# Patient Record
Sex: MALE | Race: BLACK OR AFRICAN AMERICAN | Employment: UNEMPLOYED | URBAN - METROPOLITAN AREA
[De-identification: names, ages, dates, MRNs, and addresses within clinical notes are randomized per-mention and may not be internally consistent; named-entity substitution may affect disease eponyms.]

---

## 2017-11-28 ENCOUNTER — OFFICE VISIT (OUTPATIENT)
Dept: INTERNAL MEDICINE CLINIC | Age: 47
End: 2017-11-28

## 2017-11-28 VITALS
TEMPERATURE: 97.9 F | DIASTOLIC BLOOD PRESSURE: 74 MMHG | SYSTOLIC BLOOD PRESSURE: 113 MMHG | WEIGHT: 241 LBS | OXYGEN SATURATION: 99 % | HEIGHT: 68 IN | BODY MASS INDEX: 36.53 KG/M2 | HEART RATE: 86 BPM

## 2017-11-28 DIAGNOSIS — M65.332 TRIGGER MIDDLE FINGER OF LEFT HAND: Primary | ICD-10-CM

## 2017-11-28 NOTE — PROGRESS NOTES
Patient is here today for acute visit. Complaining of left  3rd finger not able to bend and pain with movement. Not   accident related. Onset x 2 months.

## 2017-11-28 NOTE — PROGRESS NOTES
HISTORY OF PRESENT ILLNESS  Benjamin Lewis is a 49 y.o. male. HPI     C/o pain when trying to flex left and extend left 3rd finger x 2 mos  No trauma and no swelling   right handed\  advil does not help much      Patient Active Problem List    Diagnosis Date Noted    Hyperglycemia 06/04/2014    Obesity 09/24/2013       No Known Allergies        ROS    Physical Exam   Musculoskeletal:   Some stiffiness of left 3rd finger at PIP       ASSESSMENT and PLAN  Diagnoses and all orders for this visit:    1. Trigger middle finger of left hand  Elizabeth Ro [facility]   Reference handout was given to pt and discussed      Follow-up Disposition:  Return in about 9 months (around 8/28/2018) for cpe.

## 2017-11-28 NOTE — MR AVS SNAPSHOT
Visit Information Date & Time Provider Department Dept. Phone Encounter #  
 11/28/2017  9:15 AM Kika Long, [address] [place] [address],[address] [phone] [id number] Follow-up Instructions Return in about 9 months (around 8/28/2018) for cpe. Upcoming Health Maintenance Date Due DTaP/Tdap/Td series (1 - Tdap) 4/2/1990 Allergies as of 11/28/2017  Review Complete On: 11/28/2017 By: Sherrill Bryant LPN No Known Allergies Current Immunizations  Never Reviewed No immunizations on file. Not reviewed this visit You Were Diagnosed With   
  
 Codes Comments Trigger middle finger of left hand    -  Primary ICD-10-CM: T62.013 ICD-9-CM: 727.03 Vitals BP Pulse Temp Height(growth percentile) Weight(growth percentile) SpO2  
 113/74 (BP 1 Location: Left arm, BP Patient Position: Sitting) 86 97.9 °F (36.6 °C) (Oral) 5' 8\" (1.727 m) 241 lb (109.3 kg) 99% BMI Smoking Status 36.64 kg/m2 Never Smoker BMI and BSA Data Body Mass Index Body Surface Area  
 36.64 kg/m 2 2.29 m 2 Preferred Pharmacy Pharmacy Name Phone [facility] [address] [phone] Your Updated Medication List  
  
Notice  As of 11/28/2017 10:16 AM  
 You have not been prescribed any medications. We Performed the Following REFERRAL TO ORTHOPEDICS [AXR185 Custom] Follow-up Instructions Return in about 9 months (around 8/28/2018) for cpe. Referral Information Referral ID Referred By Referred To  
  
 [id number] SHAUN MACIEL [facility]   
   [address] [facility] [address], [address] [place] [address] Visits Status Start Date End Date 1 New Request 11/28/17 11/28/18 If your referral has a status of pending review or denied, additional information will be sent to support the outcome of this decision. [facility]! [facility] introduces [employer] patient portal. Now you can access parts of your medical record, email your doctor's office, and request medication refills online. 1. In your internet browser, go to [email or web address] 2. Click on the First Time User? Click Here link in the Sign In box. You will see the New Member Sign Up page. 3. Enter your Etonkids Access Code exactly as it appears below. You will not need to use this code after youve completed the sign-up process. If you do not sign up before the expiration date, you must request a new code. · E[employer]kids Access Code: [email or web address] Expires: 2/26/2018 10:16 AM 
 
4. Enter the last four digits of your Social Security Number (xxxx) and Date of Birth (mm/dd/yyyy) as indicated and click Submit. You will be taken to the next sign-up page. 5. Create a Etonkids ID. This will be your Etonkids login ID and cannot be changed, so think of one that is secure and easy to remember. 6. Create a Etonkids password. You can change your password at any time. 7. Enter your Password Reset Question and Answer. This can be used at a later time if you forget your password. 8. Enter your e-mail address. You will receive e-mail notification when new information is available in [address] 9. Click Sign Up. You can now view and download portions of your medical record. 10. Click the Download Summary menu link to download a portable copy of your medical information. If you have questions, please visit the Frequently Asked Questions section of the Etonkids website. Remember, Etonkids is NOT to be used for urgent needs. For medical emergencies, dial 911. Now available from your iPhone and Android! Please provide this summary of care documentation to your next provider. Your primary care clinician is listed as Farley Babinski LEE. If you have any questions after today's visit, please call [phone].

## 2018-02-16 ENCOUNTER — TELEPHONE (OUTPATIENT)
Dept: INTERNAL MEDICINE CLINIC | Age: 48
End: 2018-02-16

## 2018-02-16 NOTE — TELEPHONE ENCOUNTER
Patient states she needs a call back in reference to seeing if he can be worked in today instead of waiting for his appt with Dr. Ange Mercado on this coming Mon., 2/19/18. Patient states he's feeling worse. Please call to advise.  Thank you

## 2018-02-19 ENCOUNTER — OFFICE VISIT (OUTPATIENT)
Dept: INTERNAL MEDICINE CLINIC | Age: 48
End: 2018-02-19

## 2018-02-19 VITALS
OXYGEN SATURATION: 99 % | HEIGHT: 68 IN | TEMPERATURE: 98.1 F | SYSTOLIC BLOOD PRESSURE: 118 MMHG | WEIGHT: 238 LBS | DIASTOLIC BLOOD PRESSURE: 76 MMHG | HEART RATE: 81 BPM | BODY MASS INDEX: 36.07 KG/M2

## 2018-02-19 DIAGNOSIS — E66.9 CLASS 2 OBESITY WITHOUT SERIOUS COMORBIDITY WITH BODY MASS INDEX (BMI) OF 36.0 TO 36.9 IN ADULT, UNSPECIFIED OBESITY TYPE: ICD-10-CM

## 2018-02-19 DIAGNOSIS — L91.8 SKIN TAG: ICD-10-CM

## 2018-02-19 DIAGNOSIS — J01.20 ACUTE NON-RECURRENT ETHMOIDAL SINUSITIS: Primary | ICD-10-CM

## 2018-02-19 RX ORDER — AMOXICILLIN 500 MG/1
500 CAPSULE ORAL 3 TIMES DAILY
Qty: 21 CAP | Refills: 0 | Status: SHIPPED | OUTPATIENT
Start: 2018-02-19 | End: 2018-08-06 | Stop reason: ALTCHOICE

## 2018-02-19 NOTE — PROGRESS NOTES
Patient is here today for acute visit complaining of head and chest congestion   non-productive cough onset 2/14/18. Patient also complaining of low back pain   not accident related onset x 1 month.

## 2018-02-19 NOTE — PROGRESS NOTES
HISTORY OF PRESENT ILLNESS  Maureen Sandoval is a 49 y.o. male. HPI   Sick for 8 d  Had chills x 2 days then chest and sinus congestion  No sorethroat  Some popping of ears  Deep cough  Sinus congestion--clear, no sinus pain  Taking otc cough med  Also c/o large skin tag left axilla bothersome  C/o difficulty losing weight--request referral to see dietician    Patient Active Problem List    Diagnosis Date Noted    Hyperglycemia 06/04/2014    Obesity 09/24/2013       No Known Allergies   Lab Results  Component Value Date/Time   GFR est non-AA 60 08/03/2016 11:00 AM   GFR est AA 69 08/03/2016 11:00 AM   Creatinine 1.39 (H) 08/03/2016 11:00 AM   BUN 12 08/03/2016 11:00 AM   Sodium 140 08/03/2016 11:00 AM   Potassium 4.5 08/03/2016 11:00 AM   Chloride 100 08/03/2016 11:00 AM   CO2 23 08/03/2016 11:00 AM        ROS    Physical Exam   Constitutional: He appears well-developed and well-nourished. No distress. Appears stated age, obese, nad   HENT:   Head: Normocephalic. Cardiovascular: Normal rate, regular rhythm and normal heart sounds. Exam reveals no gallop and no friction rub. No murmur heard. Pulmonary/Chest: Effort normal and breath sounds normal.   Abdominal: Soft. Musculoskeletal: He exhibits no edema. Neurological: He is alert. Skin:   Large left axilla skin tag   Psychiatric: He has a normal mood and affect. Nursing note and vitals reviewed. ASSESSMENT and PLAN  Diagnoses and all orders for this visit:    1. Acute non-recurrent ethmoidal sinusitis   Amoxil x 7d   otc decongestant ok  2. Skin tag  -     [facility] General Surgery ref ED [facility]    3. Class 2 obesity without serious comorbidity with body mass index (BMI) of 36.0 to 36.9 in adult, unspecified obesity type  -     REFERRAL TO NUTRITION   Discussed carb counting and handout given to pt   Regular exercise     Other orders  -     amoxicillin (AMOXIL) 500 mg capsule; Take 1 Cap by mouth three (3) times daily.       Follow-up Disposition:  Return if symptoms worsen or fail to improve.

## 2018-02-19 NOTE — MR AVS SNAPSHOT
Jignesh Rebolledo [address] [facility] 
[phone] Patient: Samantha Samuel MRN: [id number] :1970 Visit Information Date & Time Provider Department Dept. Phone Encounter #  
 2018  9:15 AM Cole Lawton, [address] [place] [address],[address] Floor [phone] [id number] Follow-up Instructions Return if symptoms worsen or fail to improve. Your Appointments 2018  9:15 AM  
PHYSICAL PRE OP with Cole Lawton MD  
[facility]) Appt Note: CPE  
 [address] [address]  
[address] [facility] [address] [place] [address] Upcoming Health Maintenance Date Due DTaP/Tdap/Td series (1 - Tdap) 1990 Allergies as of 2018  Review Complete On: 2018 By: Krupa Quiroz LPN No Known Allergies Current Immunizations  Never Reviewed No immunizations on file. Not reviewed this visit You Were Diagnosed With   
  
 Codes Comments Acute non-recurrent ethmoidal sinusitis    -  Primary ICD-10-CM: J01.20 ICD-9-CM: 461.2 Skin tag     ICD-10-CM: L91.8 ICD-9-CM: 701.9 Class 2 obesity without serious comorbidity with body mass index (BMI) of 36.0 to 36.9 in adult, unspecified obesity type     ICD-10-CM: E66.9, Z68.36 
ICD-9-CM: 278.00, V85.36 Vitals BP Pulse Temp Height(growth percentile) Weight(growth percentile) SpO2  
 118/76 (BP 1 Location: Left arm, BP Patient Position: Sitting) 81 98.1 °F (36.7 °C) (Oral) 5' 8\" (1.727 m) 238 lb (108 kg) 99% BMI Smoking Status 36.19 kg/m2 Never Smoker BMI and BSA Data Body Mass Index Body Surface Area  
 36.19 kg/m 2 2.28 m 2 Preferred Pharmacy Pharmacy Name Phone [id number] [address] [phone] Your Updated Medication List  
  
   
 This list is accurate as of: 2/19/18  9:54 AM.  Always use your most recent med list.  
  
  
  
  
 amoxicillin 500 mg capsule Commonly known as:  AMOXIL Take 1 Cap by mouth three (3) times daily. Prescriptions Sent to Pharmacy Refills  
 amoxicillin (AMOXIL) 500 mg capsule 0 Sig: Take 1 Cap by mouth three (3) times daily. Class: Normal  
 Pharmacy: [facility] [address] [place] [address] #: [phone] Route: Oral  
  
We Performed the Following REFERRAL TO GENERAL SURGERY [REF27 Custom] Follow-up Instructions Return if symptoms worsen or fail to improve. Referral Information Referral ID Referred By Referred To  
  
 [id number] Edd Chacon MD   
   [address] [facility] [address] [place] [address], [address] [place] [address] Phone: [phone] Fax: [phone] Visits Status Start Date End Date 1 New Request 2/19/18 2/19/19 If your referral has a status of pending review or denied, additional information will be sent to support the outcome of this decision. Introducing [facility] & HEALTH SERVICES! Jose Gore introduces [employer] patient portal. Now you can access parts of your medical record, email your doctor's office, and request medication refills online. 1. In your internet browser, go to [email or web address] 2. Click on the First Time User? Click Here link in the Sign In box. You will see the New Member Sign Up page. 3. Enter your [employer] Access Code exactly as it appears below. You will not need to use this code after youve completed the sign-up process. If you do not sign up before the expiration date, you must request a new code. · [employer] Access Code: [email or web address] Expires: 2/26/2018 10:16 AM 
 
4. Enter the last four digits of your Social Security Number (xxxx) and Date of Birth (mm/dd/yyyy) as indicated and click Submit.  You will be taken to the next sign-up page. 5. Create a [employer] ID. This will be your [employer] login ID and cannot be changed, so think of one that is secure and easy to remember. 6. Create a [employer] password. You can change your password at any time. 7. Enter your Password Reset Question and Answer. This can be used at a later time if you forget your password. 8. Enter your e-mail address. You will receive e-mail notification when new information is available in [address] 9. Click Sign Up. You can now view and download portions of your medical record. 10. Click the Download Summary menu link to download a portable copy of your medical information. If you have questions, please visit the Frequently Asked Questions section of the [employer] website. Remember, [employer] is NOT to be used for urgent needs. For medical emergencies, dial 911. Now available from your iPhone and Android! Please provide this summary of care documentation to your next provider. Your primary care clinician is listed as Alena MACIEL. If you have any questions after today's visit, please call [phone].

## 2018-03-19 ENCOUNTER — OFFICE VISIT (OUTPATIENT)
Dept: SURGERY | Age: 48
End: 2018-03-19

## 2018-03-19 VITALS
TEMPERATURE: 96.9 F | HEART RATE: 85 BPM | DIASTOLIC BLOOD PRESSURE: 57 MMHG | HEIGHT: 68 IN | OXYGEN SATURATION: 97 % | RESPIRATION RATE: 16 BRPM | BODY MASS INDEX: 36.15 KG/M2 | WEIGHT: 238.5 LBS | SYSTOLIC BLOOD PRESSURE: 114 MMHG

## 2018-03-19 DIAGNOSIS — E66.9 CLASS 2 OBESITY WITH BODY MASS INDEX (BMI) OF 36.0 TO 36.9 IN ADULT, UNSPECIFIED OBESITY TYPE, UNSPECIFIED WHETHER SERIOUS COMORBIDITY PRESENT: ICD-10-CM

## 2018-03-19 DIAGNOSIS — L98.9 SKIN LESION: Primary | ICD-10-CM

## 2018-03-19 NOTE — PROGRESS NOTES
Chief Complaint   Patient presents with    Skin Exam     has skin tag under left arm pit x 4 years       1. Have you been to the ER, urgent care clinic since your last visit? Hospitalized since your last visit? no    2. Have you seen or consulted any other health care providers outside of the [address] [place] [address] since your last visit? Include any pap smears or colon screening.  no

## 2018-03-19 NOTE — PROGRESS NOTES
HISTORY OF PRESENT ILLNESS  Jacklyn Hernandez is a 49 y.o. male. HPI Comments:   Present for 4 years  Sometimes gets inflamed and irritated      ____________________________________________________________________________  Patient presents with:  Skin Exam: has skin tag under left arm pit x 4 years    /57 (BP 1 Location: Right arm, BP Patient Position: Sitting)  Pulse 85  Temp 96.9 °F (36.1 °C) (Oral)   Resp 16  Ht 5' 8\" (1.727 m)  Wt 108.2 kg (238 lb 8 oz)  SpO2 97%  BMI 36.26 kg/m2  Past Medical History:  No date: Calculus of kidney  Past Surgical History:  No date: HX UROLOGICAL      Comment: kidney stone extraction  Social History    Marital status:              Spouse name:                       Years of education:                 Number of children:               Social History Main Topics    Smoking status: Never Smoker                                                                Smokeless status: Never Used                        Alcohol use: No              Drug use: Yes                Special: Benzodiazepines, Prescription, OTC    Sexual activity: Yes               Partners with: Female      Review of patient's family history indicates:    Hypertension                   Mother                    Hypertension                   Father                    Current Outpatient Prescriptions:  amoxicillin (AMOXIL) 500 mg capsule, Take 1 Cap by mouth three (3) times daily. No current facility-administered medications for this visit. Allergies: No Known Allergies  _____________________________________________________________________________        Skin Problem   The history is provided by the patient. This is a chronic problem. The current episode started more than 1 week ago. The problem occurs constantly. The problem has been gradually worsening. Pertinent negatives include no chest pain, no abdominal pain, no headaches and no shortness of breath. The treatment provided no relief. Review of Systems   Constitutional: Negative for chills, fever and weight loss. HENT: Negative for ear pain. Eyes: Negative for pain. Respiratory: Negative for shortness of breath. Cardiovascular: Negative for chest pain. Gastrointestinal: Negative for abdominal pain and blood in stool. Genitourinary: Negative for hematuria. Musculoskeletal: Negative for joint pain. Skin: Negative for rash. Neurological: Negative for dizziness, focal weakness, seizures and headaches. Endo/Heme/Allergies: Does not bruise/bleed easily. Psychiatric/Behavioral: The patient does not have insomnia. Physical Exam   Constitutional: He is oriented to person, place, and time. He appears well-developed and well-nourished. No distress. HENT:   Head: Normocephalic and atraumatic. Mouth/Throat: No oropharyngeal exudate. Eyes: Pupils are equal, round, and reactive to light. Neck: Normal range of motion. No tracheal deviation present. Cardiovascular: Normal rate, regular rhythm and normal heart sounds. No murmur heard. Pulmonary/Chest: Effort normal and breath sounds normal. No respiratory distress. He has no wheezes. Abdominal: Soft. Bowel sounds are normal. He exhibits no distension and no mass. There is no tenderness. There is no rebound and no guarding. Musculoskeletal: Normal range of motion. He exhibits no edema or tenderness. Lymphadenopathy:     He has no cervical adenopathy. Neurological: He is alert and oriented to person, place, and time. Skin: Skin is warm. No rash noted. He is not diaphoretic. No erythema. Mildly tender large skin lesion left axilla. Psychiatric: He has a normal mood and affect. His behavior is normal.       ASSESSMENT and PLAN    ICD-10-CM ICD-9-CM    1. Skin lesion L98.9 709.9    2.  Class 2 obesity with body mass index (BMI) of 36.0 to 36.9 in adult, unspecified obesity type, unspecified whether serious comorbidity present E66.9 278.00     Z68.36 V85.36 I had an extensive discussion with Jazz Khanna regarding the risks, benefits, and alternatives of proceeding with excision of this skin lesion. Risks of surgery including the risk of anesthesia, bleeding, infection, injury to underlying structures, recurrence, need for further surgery, and the lack of symptomatic improvement were discussed and he is in agreement to proceed. I offered to do the surgery for him today. He prefers to reschedule for another day. Will schedule at his convenience. Thank you for this consult.

## 2018-03-19 NOTE — MR AVS SNAPSHOT
[address], [address] [facility], [facility] 2305 [facility] 
[phone] Patient: Mary Acevedo MRN: [id number] :1970 Visit Information Date & Time Provider Department Dept. Phone Encounter #  
 3/19/2018  3:10 PM Luis Presley MD [facility] [id number] Your Appointments 2018  9:15 AM  
PHYSICAL PRE OP with Carlos Esqueda MD  
[facility] [facility]) Appt Note: CPE  
 [facility] [address] [address]  
[address] [facility] [address] [place] [address] Upcoming Health Maintenance Date Due DTaP/Tdap/Td series (1 - Tdap) 1990 Allergies as of 3/19/2018  Review Complete On: 3/19/2018 By: Lynne Reina LPN No Known Allergies Current Immunizations  Never Reviewed No immunizations on file. Not reviewed this visit Vitals BP Pulse Temp Resp Height(growth percentile) Weight(growth percentile) 114/57 (BP 1 Location: Right arm, BP Patient Position: Sitting) 85 96.9 °F (36.1 °C) (Oral) 16 5' 8\" (1.727 m) 238 lb 8 oz (108.2 kg) SpO2 BMI Smoking Status 97% 36.26 kg/m2 Never Smoker Vitals History BMI and BSA Data Body Mass Index Body Surface Area  
 36.26 kg/m 2 2.28 m 2 Preferred Pharmacy Pharmacy Name Phone [id number] [address] [phone] Your Updated Medication List  
  
   
This list is accurate as of 3/19/18  3:29 PM.  Always use your most recent med list.  
  
  
  
  
 amoxicillin 500 mg capsule Commonly known as:  AMOXIL Take 1 Cap by mouth three (3) times daily. Introducing [address]!    
 Champ Gusman introduces [employer] patient portal. Now you can access parts of your medical record, email your doctor's office, and request medication refills online. 1. In your internet browser, go to [email or web address] 2. Click on the First Time User? Click Here link in the Sign In box. You will see the New Member Sign Up page. 3. Enter your Be[employer] Access Code exactly as it appears below. You will not need to use this code after youve completed the sign-up process. If you do not sign up before the expiration date, you must request a new code. · Be[employer] Access Code: [email or web address] Expires: 6/11/2018 12:21 PM 
 
4. Enter the last four digits of your Social Security Number (xxxx) and Date of Birth (mm/dd/yyyy) as indicated and click Submit. You will be taken to the next sign-up page. 5. Create a Beibamboo ID. This will be your Be[employer] login ID and cannot be changed, so think of one that is secure and easy to remember. 6. Create a Beibamboo password. You can change your password at any time. 7. Enter your Password Reset Question and Answer. This can be used at a later time if you forget your password. 8. Enter your e-mail address. You will receive e-mail notification when new information is available in [address] 9. Click Sign Up. You can now view and download portions of your medical record. 10. Click the Download Summary menu link to download a portable copy of your medical information. If you have questions, please visit the Frequently Asked Questions section of the [employer] website. Remember, [employer] is NOT to be used for urgent needs. For medical emergencies, dial 911. Now available from your iPhone and Android! Please provide this summary of care documentation to your next provider. Your primary care clinician is listed as Arnoldo MACIEL. If you have any questions after today's visit, please call [phone].

## 2018-03-22 ENCOUNTER — OFFICE VISIT (OUTPATIENT)
Dept: SURGERY | Age: 48
End: 2018-03-22

## 2018-03-22 VITALS
SYSTOLIC BLOOD PRESSURE: 136 MMHG | HEIGHT: 68 IN | DIASTOLIC BLOOD PRESSURE: 79 MMHG | BODY MASS INDEX: 35.77 KG/M2 | TEMPERATURE: 96.4 F | OXYGEN SATURATION: 95 % | WEIGHT: 236 LBS | HEART RATE: 97 BPM

## 2018-03-22 DIAGNOSIS — L98.9 SKIN LESION: ICD-10-CM

## 2018-03-22 DIAGNOSIS — L81.9 PIGMENTED SKIN LESIONS: ICD-10-CM

## 2018-03-22 DIAGNOSIS — L91.8 INFLAMED SKIN TAG: Primary | ICD-10-CM

## 2018-03-22 RX ORDER — LIDOCAINE HYDROCHLORIDE 10 MG/ML
10 INJECTION, SOLUTION EPIDURAL; INFILTRATION; INTRACAUDAL; PERINEURAL ONCE
Qty: 10 ML | Refills: 0
Start: 2018-03-22 | End: 2018-03-22

## 2018-03-22 NOTE — PROGRESS NOTES
1. Have you been to the ER, urgent care clinic since your last visit? Hospitalized since your last visit. New patient  2. Have you seen or consulted any other health care providers outside of the [address] [place] [address] since your last visit? Include any pap smears or colon screening. New patient. Does not have advanced directive.

## 2018-03-22 NOTE — PATIENT INSTRUCTIONS
How to Care for Your Wound After Its Treated With  DERMABOND* Topical Skin Adhesive  DERMABOND* Topical Skin Adhesive (2-octyl cyanoacrylate) is a sterile, liquid skin adhesive  that holds wound edges together. The film will usually remain in place for 5 to 10 days, then  naturally fall off your skin. The following will answer some of your questions and provide instructions for proper care for your  wound while it is healing:    CHECK WOUND APPEARANCE   Some swelling, redness, and pain are common with all wounds and normally will go away as the  wound heals. If swelling, redness, or pain increases or if the wound feels warm to the touch,  contact a doctor. Also contact a doctor if the wound edges reopen or separate. REPLACE BANDAGES   If your wound is bandaged, keep the bandage dry.  Replace the dressing daily until the adhesive film has fallen off or if the  bandage should become wet, unless otherwise instructed by your  physician.  When changing the dressing, do not place tape directly over the  DERMABOND adhesive film, because removing the tape later may also  remove the film. AVOID TOPICAL MEDICATIONS   Do not apply liquid or ointment medications or any other product to your wound while the  DERMABOND adhesive film is in place. These may loosen the film before your wound is healed. KEEP WOUND DRY AND PROTECTED   You may occasionally and briefly wet your wound in the shower or bath. Do not soak or scrub  your wound, do not swim, and avoid periods of heavy perspiration until the DERMABOND  adhesive has naturally fallen off. After showering or bathing, gently blot your wound dry with a  soft towel. If a protective dressing is being used, apply a fresh, dry bandage, being sure to keep  the tape off the DERMABOND adhesive film.  Apply a clean, dry bandage over the wound if necessary to protect it.    Protect your wound from injury until the skin has had sufficient time to heal.   Do not scratch, rub, or pick at the [facility] adhesive film. This may loosen the film before  your wound is healed.  Protect the wound from prolonged exposure to sunlight or tanning lamps while the film is in  place. If you have any questions or concerns about this product, please consult your doctor.   *Trademark ©[employer] 2002

## 2018-03-22 NOTE — MR AVS SNAPSHOT
[address], [address] [facility], [address] [place] [address] [facility] 
[phone] Patient: Radha Garza MRN: [id number] :1970 Visit Information Date & Time Provider Department Dept. Phone Encounter #  
 3/22/2018 10:10 AM Evie Pate MD [facility] [address] [phone] [id number] Your Appointments 3/30/2018 10:00 AM  
POST OP 10 MIN with Evie Pate MD  
[facility] [address] Aston Humphrey ([facility] Appt Note: post op excision of left axilla lesion on 3/22/18  
 [address], [address] [facility], [address] [address]  
[address], [address] [facility], [address] [facility] [address] [address]  
  
    
 2018  9:15 AM  
PHYSICAL PRE OP with Grupo Rivas MD  
[facility]) Appt Note: CPE  
 [facility] [address] [address]  
[address] [facility] [address] [place] [address] Upcoming Health Maintenance Date Due DTaP/Tdap/Td series (1 - Tdap) 1990 Allergies as of 3/22/2018  Review Complete On: 3/22/2018 By: Kulwant Maya LPN No Known Allergies Current Immunizations  Never Reviewed No immunizations on file. Not reviewed this visit You Were Diagnosed With   
  
 Codes Comments Skin lesion    -  Primary ICD-10-CM: L98.9 ICD-9-CM: 709.9 Vitals BP Pulse Temp Height(growth percentile) Weight(growth percentile) SpO2  
 136/79 (BP 1 Location: Right arm, BP Patient Position: Sitting) 97 96.4 °F (35.8 °C) 5' 8\" (1.727 m) 236 lb (107 kg) 95% BMI Smoking Status 35.88 kg/m2 Never Smoker Vitals History BMI and BSA Data Body Mass Index Body Surface Area  
 35.88 kg/m 2 2.27 m 2 Preferred Pharmacy Pharmacy Name Phone  [id number] [address] [place] [address] Nw, [address] [place] [address] RO[address] [phone] Your Updated Medication List  
  
   
This list is accurate as of 3/22/18 10:52 AM.  Always use your most recent med list.  
  
  
  
  
 amoxicillin 500 mg capsule Commonly known as:  AMOXIL Take 1 Cap by mouth three (3) times daily. lidocaine (PF) 10 mg/mL (1 %) injection Commonly known as:  XYLOCAINE 10 mL by IntraVENous route once for 1 dose. Indications: exp date 11/20. lot # [id number] 2% Patient Instructions How to Care for Your Wound After Its Treated With DERMABOND* Topical Skin Adhesive DERMABOND* Topical Skin Adhesive (2-octyl cyanoacrylate) is a sterile, liquid skin adhesive 
that holds wound edges together. The film will usually remain in place for 5 to 10 days, then 
naturally fall off your skin. The following will answer some of your questions and provide instructions for proper care for your 
wound while it is healing: CHECK WOUND APPEARANCE 
 Some swelling, redness, and pain are common with all wounds and normally will go away as the 
wound heals. If swelling, redness, or pain increases or if the wound feels warm to the touch, 
contact a doctor. Also contact a doctor if the wound edges reopen or separate. REPLACE BANDAGES 
 If your wound is bandaged, keep the bandage dry.  Replace the dressing daily until the adhesive film has fallen off or if the 
bandage should become wet, unless otherwise instructed by your 
physician.  When changing the dressing, do not place tape directly over the DERMABOND adhesive film, because removing the tape later may also 
remove the film. AVOID TOPICAL MEDICATIONS  Do not apply liquid or ointment medications or any other product to your wound while the DERMABOND adhesive film is in place. These may loosen the film before your wound is healed. KEEP WOUND DRY AND PROTECTED  You may occasionally and briefly wet your wound in the shower or bath. Do not soak or scrub your wound, do not swim, and avoid periods of heavy perspiration until the DERMABOND 
adhesive has naturally fallen off. After showering or bathing, gently blot your wound dry with a 
soft towel. If a protective dressing is being used, apply a fresh, dry bandage, being sure to keep 
the tape off the DERMABOND adhesive film.  Apply a clean, dry bandage over the wound if necessary to protect it.  Protect your wound from injury until the skin has had sufficient time to heal. 
 Do not scratch, rub, or pick at the DERMABOND adhesive film. This may loosen the film before 
your wound is healed.  Protect the wound from prolonged exposure to sunlight or tanning lamps while the film is in 
place. If you have any questions or concerns about this product, please consult your doctor. *Trademark ©[employer], inc. 2002 Introducing [facility] & [facility] SERVICES! Codie Cruz introduces [employer] patient portal. Now you can access parts of your medical record, email your doctor's office, and request medication refills online. 1. In your internet browser, go to [email or web address] 2. Click on the First Time User? Click Here link in the Sign In box. You will see the New Member Sign Up page. 3. Enter your [employer] Access Code exactly as it appears below. You will not need to use this code after youve completed the sign-up process. If you do not sign up before the expiration date, you must request a new code. · [employer] Access Code: [email or web address] Expires: 6/11/2018 12:21 PM 
 
4. Enter the last four digits of your Social Security Number (xxxx) and Date of Birth (mm/dd/yyyy) as indicated and click Submit. You will be taken to the next sign-up page. 5. Create a Pogoseatt ID. This will be your [employer] login ID and cannot be changed, so think of one that is secure and easy to remember. 6. Create a Pogoseatt password. You can change your password at any time. 7. Enter your Password Reset Question and Answer. This can be used at a later time if you forget your password. 8. Enter your e-mail address. You will receive e-mail notification when new information is available in [address] 9. Click Sign Up. You can now view and download portions of your medical record. 10. Click the Download Summary menu link to download a portable copy of your medical information. If you have questions, please visit the Frequently Asked Questions section of the [employer] website. Remember, [employer] is NOT to be used for urgent needs. For medical emergencies, dial 911. Now available from your iPhone and Android! Please provide this summary of care documentation to your next provider. Your primary care clinician is listed as Salomon MACIEL. If you have any questions after today's visit, please call [phone].

## 2018-03-22 NOTE — PROGRESS NOTES
[facility]  OFFICE PROCEDURE PROGRESS NOTE        Chart reviewed for the following:   Radha VOSS LPN, have reviewed the History, Physical and updated the Allergic reactions for [address] performed immediately prior to start of procedure:   Aleida Singleton LPN, have performed the following reviews on [address] [facility] prior to the start of the procedure:            * Patient was identified by name and date of birth   * Agreement on procedure being performed was verified  * Risks and Benefits explained to the patient  * Procedure site verified and marked as necessary  * Patient was positioned for comfort  * Consent was signed and verified     Time: 10;30am      Date of procedure: 3/22/2018    Procedure performed by:  Tico Ramos MD    Provider assisted by: NGHIA Chery    Patient assisted by: self    How tolerated by patient: Pt tolerated procedure well.     Post Procedural Pain Scale: 0/10    Comments: none

## 2018-03-26 LAB
DX ICD CODE: NORMAL
DX ICD CODE: NORMAL
PATH REPORT.FINAL DX SPEC: NORMAL
PATH REPORT.GROSS SPEC: NORMAL
PATH REPORT.RELEVANT HX SPEC: NORMAL
PATH REPORT.SITE OF ORIGIN SPEC: NORMAL
PATHOLOGIST NAME: NORMAL
PAYMENT PROCEDURE: NORMAL

## 2018-03-27 NOTE — PROGRESS NOTES
PROCEDURE NOTE      Lori Benjamin is a 49 y.o. male who has been brought to the procedure room for excision of a 3 cm skin lesion located on the left axilla. The risks, benefits, and alternatives were explained and consent was obtained for the procedure. The area was sterile prepped and draped in the usual manner. 1% lidocaine with epinephrine was infiltrated into the skin and soft tissue surrounding the inflamed lesion. An incision was made. It was dissected free of surrounding tissues and excised in its entirety. Hemostasis was noted. The wound was closed with interrupted 4-0 Monocryl; followed by  Dermabond. Wound care instructions were given. He tolerated the procedure without difficulty. Length of incision: 3 cm x 2 cm to include minimal margins.

## 2018-03-30 ENCOUNTER — OFFICE VISIT (OUTPATIENT)
Dept: SURGERY | Age: 48
End: 2018-03-30

## 2018-03-30 VITALS
TEMPERATURE: 98.2 F | SYSTOLIC BLOOD PRESSURE: 129 MMHG | HEIGHT: 68 IN | DIASTOLIC BLOOD PRESSURE: 84 MMHG | OXYGEN SATURATION: 98 % | RESPIRATION RATE: 20 BRPM | WEIGHT: 241 LBS | BODY MASS INDEX: 36.53 KG/M2 | HEART RATE: 89 BPM

## 2018-03-30 DIAGNOSIS — Z09 POSTOPERATIVE EXAMINATION: Primary | ICD-10-CM

## 2018-03-30 NOTE — PROGRESS NOTES
Chief Complaint   Patient presents with    Surgical Follow-up     Post/op excision of left axilla lesion on 3/22/18/Office procedure. Doing well.     Path: skin tag    Incision c/d/i    Reviewed wound care    F/u PRN

## 2018-03-30 NOTE — PROGRESS NOTES
1. Have you been to the ER, urgent care clinic since your last visit?no  Hospitalized since your last visit?no    2. Have you seen or consulted any other health care providers outside of the [address] [place] [address] since your last visit?no  Include any pap smears or colon screening.

## 2018-03-30 NOTE — MR AVS SNAPSHOT
[address], [address] [facility], [facility] 2305 [facility] 
[phone] Patient: Subhash Canas MRN: [id number] :1970 Visit Information Date & Time Provider Department Dept. Phone Encounter #  
 3/30/2018 10:00 AM Savi Smith MD [facility] [address] [place] [address] [id number] Your Appointments 2018  9:15 AM  
PHYSICAL PRE OP with Marina Espinoza MD  
[facility]) Appt Note: CPE  
 [address] [facility] [address] [address]  
[address] [facility] [address] Upcoming Health Maintenance Date Due DTaP/Tdap/Td series (1 - Tdap) 1990 Allergies as of 3/30/2018  Review Complete On: 3/30/2018 By: Savi Smith MD  
 No Known Allergies Current Immunizations  Never Reviewed No immunizations on file. Not reviewed this visit You Were Diagnosed With   
  
 Codes Comments Postoperative examination    -  Primary ICD-10-CM: S99 ICD-9-CM: V67.00 Vitals BP Pulse Temp Resp Height(growth percentile) Weight(growth percentile) 129/84 (BP 1 Location: Left arm, BP Patient Position: Sitting) 89 98.2 °F (36.8 °C) (Oral) 20 5' 8\" (1.727 m) 241 lb (109.3 kg) SpO2 BMI Smoking Status 98% 36.64 kg/m2 Never Smoker BMI and BSA Data Body Mass Index Body Surface Area  
 36.64 kg/m 2 2.29 m 2 Preferred Pharmacy Pharmacy Name Phone [id number] [address] [phone] Your Updated Medication List  
  
   
This list is accurate as of 3/30/18 10:10 AM.  Always use your most recent med list.  
  
  
  
  
 amoxicillin 500 mg capsule Commonly known as:  AMOXIL Take 1 Cap by mouth three (3) times daily. Introducing [facility] & HEALTH SERVICES! Champ Gusman introduces [employer] patient portal. Now you can access parts of your medical record, email your doctor's office, and request medication refills online. 1. In your internet browser, go to [email or web address] 2. Click on the First Time User? Click Here link in the Sign In box. You will see the New Member Sign Up page. 3. Enter your [employer] Access Code exactly as it appears below. You will not need to use this code after youve completed the sign-up process. If you do not sign up before the expiration date, you must request a new code. · [employer] Access Code: [email or web address] Expires: 6/11/2018 12:21 PM 
 
4. Enter the last four digits of your Social Security Number (xxxx) and Date of Birth (mm/dd/yyyy) as indicated and click Submit. You will be taken to the next sign-up page. 5. Create a [employer] ID. This will be your [employer] login ID and cannot be changed, so think of one that is secure and easy to remember. 6. Create a [employer] password. You can change your password at any time. 7. Enter your Password Reset Question and Answer. This can be used at a later time if you forget your password. 8. Enter your e-mail address. You will receive e-mail notification when new information is available in [address] 9. Click Sign Up. You can now view and download portions of your medical record. 10. Click the Download Summary menu link to download a portable copy of your medical information. If you have questions, please visit the Frequently Asked Questions section of the [employer] website. Remember, [employer] is NOT to be used for urgent needs. For medical emergencies, dial 911. Now available from your iPhone and Android! Please provide this summary of care documentation to your next provider. Your primary care clinician is listed as Joaquin MACIEL. If you have any questions after today's visit, please call [phone].

## 2018-08-05 NOTE — PROGRESS NOTES
HISTORY OF PRESENT ILLNESS  Conley Sandhoff is a 51 y.o. male. HPI     Pt here for CPE and biometrics--waist 55  Will bring biometric form later  Exercises at gym, walks  Works in IT    Hx prediabetes and obesity  C/o numbness in left thigh constantly x 3 months--intermittent low back pain. Intermittent left thigh numbness since last year but constant x 3 mos  No leg weakness, runs treadmill  Patient Active Problem List    Diagnosis Date Noted    Skin lesion 03/19/2018    Hyperglycemia 06/04/2014    Obesity 09/24/2013     Current Outpatient Prescriptions   Medication Sig Dispense Refill    amoxicillin (AMOXIL) 500 mg capsule Take 1 Cap by mouth three (3) times daily. 21 Cap 0     No Known Allergies   Lab Results  Component Value Date/Time   WBC 10.2 08/03/2016 11:00 AM   HGB 15.7 08/03/2016 11:00 AM   HCT 45.7 08/03/2016 11:00 AM   PLATELET 562 90/00/9002 11:00 AM   MCV 91 08/03/2016 11:00 AM     Lab Results  Component Value Date/Time   Hemoglobin A1c 6.1 (H) 08/03/2016 11:00 AM   Hemoglobin A1c 6.0 (H) 06/03/2014 10:17 AM   Glucose 90 08/03/2016 11:00 AM   LDL, calculated 118 (H) 08/03/2016 11:00 AM   Creatinine 1.39 (H) 08/03/2016 11:00 AM      Lab Results  Component Value Date/Time   Cholesterol, total 188 08/03/2016 11:00 AM   HDL Cholesterol 55 08/03/2016 11:00 AM   LDL, calculated 118 (H) 08/03/2016 11:00 AM   Triglyceride 75 08/03/2016 11:00 AM     Lab Results  Component Value Date/Time   GFR est non-AA 60 08/03/2016 11:00 AM   GFR est AA 69 08/03/2016 11:00 AM   Creatinine 1.39 (H) 08/03/2016 11:00 AM   BUN 12 08/03/2016 11:00 AM   Sodium 140 08/03/2016 11:00 AM   Potassium 4.5 08/03/2016 11:00 AM   Chloride 100 08/03/2016 11:00 AM   CO2 23 08/03/2016 11:00 AM        ROS    Physical Exam   Constitutional: He appears well-developed and well-nourished. No distress. Appears stated age. Obese , nad   HENT:   Head: Normocephalic.    Mouth/Throat: Oropharynx is clear and moist.   Eyes: Pupils are equal, round, and reactive to light. Neck: No JVD present. No tracheal deviation present. No thyromegaly present. Cardiovascular: Normal rate, regular rhythm and normal heart sounds. Exam reveals no gallop and no friction rub. No murmur heard. Pulmonary/Chest: Effort normal and breath sounds normal. No respiratory distress. He has no wheezes. He has no rales. He exhibits no tenderness. Abdominal: Soft. He exhibits no mass. There is no rebound and no guarding. Musculoskeletal: He exhibits no edema. Lymphadenopathy:     He has no cervical adenopathy. Neurological: He is alert. Psychiatric: He has a normal mood and affect. Nursing note and vitals reviewed. ASSESSMENT and PLAN  Diagnoses and all orders for this visit:    1. Routine general medical examination at a health care facility  -     PSA SCREENING (SCREENING) ()  -     URINALYSIS W/ RFLX MICROSCOPIC  -     HEMOGLOBIN A1C WITH EAG  -     METABOLIC PANEL, COMPREHENSIVE  -     LIPID PANEL  -     TSH 3RD GENERATION  -     CBC W/O DIFF   Pt dec;lines Tdap   Weight reduction needed-discussed  2. Hyperglycemia   Low carb diet and carb counting discussion and handout  3. Class 2 obesity with body mass index (BMI) of 36.0 to 36.9 in adult, unspecified obesity type, unspecified whether serious comorbidity present   I have reviewed/discussed the above normal BMI with the patient. I have recommended the following interventions: dietary management education, guidance, and counseling and encourage exercise . [place] 4. Numbness of left anterior thigh  -     XR SPINE LUMB 2 OR 3 V; Future   Consider referral to neruologist or ortho spine pending xr results  Other orders  -     sildenafil citrate (VIAGRA) 100 mg tablet; Take 1 Tab by mouth as needed. Follow-up Disposition:  Return in about 1 year (around 8/6/2019) for cpe.

## 2018-08-06 ENCOUNTER — OFFICE VISIT (OUTPATIENT)
Dept: INTERNAL MEDICINE CLINIC | Age: 48
End: 2018-08-06

## 2018-08-06 VITALS
BODY MASS INDEX: 35.77 KG/M2 | DIASTOLIC BLOOD PRESSURE: 77 MMHG | OXYGEN SATURATION: 98 % | TEMPERATURE: 97.9 F | WEIGHT: 236 LBS | HEIGHT: 68 IN | HEART RATE: 82 BPM | SYSTOLIC BLOOD PRESSURE: 114 MMHG

## 2018-08-06 DIAGNOSIS — R20.0 TACTILE ANESTHESIA: Primary | ICD-10-CM

## 2018-08-06 DIAGNOSIS — E66.9 CLASS 2 OBESITY WITH BODY MASS INDEX (BMI) OF 36.0 TO 36.9 IN ADULT, UNSPECIFIED OBESITY TYPE, UNSPECIFIED WHETHER SERIOUS COMORBIDITY PRESENT: ICD-10-CM

## 2018-08-06 DIAGNOSIS — Z00.00 ROUTINE GENERAL MEDICAL EXAMINATION AT A HEALTH CARE FACILITY: Primary | ICD-10-CM

## 2018-08-06 DIAGNOSIS — R20.0 NUMBNESS OF LEFT ANTERIOR THIGH: ICD-10-CM

## 2018-08-06 DIAGNOSIS — R73.9 HYPERGLYCEMIA: ICD-10-CM

## 2018-08-06 RX ORDER — SILDENAFIL 100 MG/1
100 TABLET, FILM COATED ORAL AS NEEDED
Qty: 6 TAB | Refills: 11 | Status: SHIPPED | OUTPATIENT
Start: 2018-08-06 | End: 2022-10-24 | Stop reason: ALTCHOICE

## 2018-08-06 NOTE — PROGRESS NOTES
Chief Complaint   Patient presents with    Complete Physical     yearly    Labs     Fasting    Leg Pain     left thigh area . onset over 6 months.  Not accident related

## 2018-08-06 NOTE — MR AVS SNAPSHOT
[address] 
[phone] Patient: Azul Kruse MRN: [id number] :1970 Visit Information Date & Time Provider Department Dept. Phone Encounter #  
 2018  9:15 AM Beth Vargas, [address] [place] [address],[address] [phone] [id number] Follow-up Instructions Return in about 1 year (around 2019) for cpe. Upcoming Health Maintenance Date Due DTaP/Tdap/Td series (1 - Tdap) 1990 Influenza Age 5 to Adult 2018 Allergies as of 2018  Review Complete On: 2018 By: Jelly Mata LPN No Known Allergies Current Immunizations  Never Reviewed No immunizations on file. Not reviewed this visit You Were Diagnosed With   
  
 Codes Comments Routine general medical examination at a health care facility    -  Primary ICD-10-CM: Z00.00 ICD-9-CM: V70.0 Hyperglycemia     ICD-10-CM: R73.9 ICD-9-CM: 790.29 Class 2 obesity with body mass index (BMI) of 36.0 to 36.9 in adult, unspecified obesity type, unspecified whether serious comorbidity present     ICD-10-CM: E66.9, Z68.36 
ICD-9-CM: 278.00, V85.36 Numbness of left anterior thigh     ICD-10-CM: R20.0 ICD-9-CM: 854. 0 Vitals BP Pulse Temp Height(growth percentile) Weight(growth percentile) SpO2  
 114/77 (BP 1 Location: Left arm, BP Patient Position: Sitting) 82 97.9 °F (36.6 °C) (Oral) 5' 8\" (1.727 m) 236 lb (107 kg) 98% BMI Smoking Status 35.88 kg/m2 Never Smoker BMI and BSA Data Body Mass Index Body Surface Area  
 35.88 kg/m 2 2.27 m 2 Preferred Pharmacy Pharmacy Name Phone [facility] [address] [phone] Your Updated Medication List  
  
   
This list is accurate as of 18  9:47 AM.  Always use your most recent med list.  
  
  
  
  
 sildenafil citrate 100 mg tablet Commonly known as:  VIAGRA Take 1 Tab by mouth as needed. Prescriptions Sent to Pharmacy Refills  
 sildenafil citrate (VIAGRA) 100 mg tablet 11 Sig: Take 1 Tab by mouth as needed. Class: Normal  
 [facility]: [id number] [address] [place] [address] #: [phone] Route: Oral  
  
We Performed the Following CBC W/O DIFF [85821 CPT(R)] HEMOGLOBIN A1C WITH EAG [35291 CPT(R)] LIPID PANEL [20268 CPT(R)] METABOLIC PANEL, COMPREHENSIVE [14172 CPT(R)] PSA SCREENING (SCREENING) [ [facility]] TSH 3RD GENERATION [91091 CPT(R)] URINALYSIS W/ RFLX MICROSCOPIC [05438 CPT(R)] Follow-up Instructions Return in about 1 year (around 8/6/2019) for cpe. To-Do List   
 08/06/2018 Imaging:  XR SPINE LUMB 2 OR 3 V Introducing \[facility] [place] [facility]\"" & HEALTH SERVICES! [employer] introduces [employer] patient portal. Now you can access parts of your medical record, email your doctor's office, and request medication refills online. 1. In your internet browser, go to [email or web address] 2. Click on the First Time User? Click Here link in the Sign In box. You will see the New Member Sign Up page. 3. Enter your [employer] Access Code exactly as it appears below. You will not need to use this code after youve completed the sign-up process. If you do not sign up before the expiration date, you must request a new code. · [employer] Access Code: [email or web address] Expires: 11/4/2018  9:44 AM 
 
4. Enter the last four digits of your Social Security Number (xxxx) and Date of Birth (mm/dd/yyyy) as indicated and click Submit. You will be taken to the next sign-up page. 5. Create a Octopus Deployt ID. This will be your [employer] login ID and cannot be changed, so think of one that is secure and easy to remember. 6. Create a Oct[employer] password. You can change your password at any time. 7. Enter your Password Reset Question and Answer. This can be used at a later time if you forget your password. 8. Enter your e-mail address. You will receive e-mail notification when new information is available in [address] 9. Click Sign Up. You can now view and download portions of your medical record. 10. Click the Download Summary menu link to download a portable copy of your medical information. If you have questions, please visit the Frequently Asked Questions section of the [employer] website. Remember, [employer] is NOT to be used for urgent needs. For medical emergencies, dial 911. Now available from your iPhone and Android! Please provide this summary of care documentation to your next provider. Your primary care clinician is listed as Marion MACIEL. If you have any questions after today's visit, please call [phone].

## 2018-08-07 LAB
ALBUMIN SERPL-MCNC: 4.4 G/DL (ref 3.5–5.5)
ALBUMIN/GLOB SERPL: 1.6 {RATIO} (ref 1.2–2.2)
ALP SERPL-CCNC: 82 IU/L (ref 39–117)
ALT SERPL-CCNC: 29 IU/L (ref 0–44)
APPEARANCE UR: CLEAR
AST SERPL-CCNC: 24 IU/L (ref 0–40)
BILIRUB SERPL-MCNC: 0.4 MG/DL (ref 0–1.2)
BILIRUB UR QL STRIP: NEGATIVE
BUN SERPL-MCNC: 11 MG/DL (ref 6–24)
BUN/CREAT SERPL: 9 (ref 9–20)
CALCIUM SERPL-MCNC: 9.6 MG/DL (ref 8.7–10.2)
CHLORIDE SERPL-SCNC: 100 MMOL/L (ref 96–106)
CHOLEST SERPL-MCNC: 188 MG/DL (ref 100–199)
CO2 SERPL-SCNC: 25 MMOL/L (ref 20–29)
COLOR UR: YELLOW
CREAT SERPL-MCNC: 1.26 MG/DL (ref 0.76–1.27)
ERYTHROCYTE [DISTWIDTH] IN BLOOD BY AUTOMATED COUNT: 14.3 % (ref 12.3–15.4)
EST. AVERAGE GLUCOSE BLD GHB EST-MCNC: 128 MG/DL
GLOBULIN SER CALC-MCNC: 2.8 G/DL (ref 1.5–4.5)
GLUCOSE SERPL-MCNC: 90 MG/DL (ref 65–99)
GLUCOSE UR QL: NEGATIVE
HBA1C MFR BLD: 6.1 % (ref 4.8–5.6)
HCT VFR BLD AUTO: 45 % (ref 37.5–51)
HDLC SERPL-MCNC: 51 MG/DL
HGB BLD-MCNC: 15.7 G/DL (ref 13–17.7)
HGB UR QL STRIP: NEGATIVE
KETONES UR QL STRIP: NEGATIVE
LDLC SERPL CALC-MCNC: 124 MG/DL (ref 0–99)
LEUKOCYTE ESTERASE UR QL STRIP: NEGATIVE
MCH RBC QN AUTO: 31.8 PG (ref 26.6–33)
MCHC RBC AUTO-ENTMCNC: 34.9 G/DL (ref 31.5–35.7)
MCV RBC AUTO: 91 FL (ref 79–97)
MICRO URNS: NORMAL
NITRITE UR QL STRIP: NEGATIVE
PH UR STRIP: 5 [PH] (ref 5–7.5)
PLATELET # BLD AUTO: 357 X10E3/UL (ref 150–379)
POTASSIUM SERPL-SCNC: 4.9 MMOL/L (ref 3.5–5.2)
PROT SERPL-MCNC: 7.2 G/DL (ref 6–8.5)
PROT UR QL STRIP: NEGATIVE
PSA SERPL-MCNC: 13.7 NG/ML (ref 0–4)
RBC # BLD AUTO: 4.94 X10E6/UL (ref 4.14–5.8)
SODIUM SERPL-SCNC: 138 MMOL/L (ref 134–144)
SP GR UR: 1.02 (ref 1–1.03)
TRIGL SERPL-MCNC: 64 MG/DL (ref 0–149)
TSH SERPL DL<=0.005 MIU/L-ACNC: 0.79 UIU/ML (ref 0.45–4.5)
UROBILINOGEN UR STRIP-MCNC: 0.2 MG/DL (ref 0.2–1)
VLDLC SERPL CALC-MCNC: 13 MG/DL (ref 5–40)
WBC # BLD AUTO: 8.5 X10E3/UL (ref 3.4–10.8)

## 2018-08-08 ENCOUNTER — TELEPHONE (OUTPATIENT)
Dept: INTERNAL MEDICINE CLINIC | Age: 48
End: 2018-08-08

## 2018-08-08 NOTE — TELEPHONE ENCOUNTER
Per Alvaro Farrell, called patient earlier and forwarding Dr. Alvaro Farrell this message at Dr. Pena Cuff request.

## 2018-08-23 ENCOUNTER — DOCUMENTATION ONLY (OUTPATIENT)
Dept: INTERNAL MEDICINE CLINIC | Age: 48
End: 2018-08-23

## 2018-08-23 NOTE — PROGRESS NOTES
Patient delivered form from [facility] [address], [facility] Reporting for Dr. Brooklyn Hernandez to complete. Turnaround time and potential fee was discussed. Form was placed in Dr. Ronni Amaya box.

## 2018-09-12 ENCOUNTER — OFFICE VISIT (OUTPATIENT)
Dept: NEUROLOGY | Age: 48
End: 2018-09-12

## 2018-09-12 VITALS
SYSTOLIC BLOOD PRESSURE: 128 MMHG | HEART RATE: 80 BPM | DIASTOLIC BLOOD PRESSURE: 78 MMHG | OXYGEN SATURATION: 98 % | WEIGHT: 236 LBS | HEIGHT: 68 IN | BODY MASS INDEX: 35.77 KG/M2

## 2018-09-12 DIAGNOSIS — G57.12 MERALGIA PARAESTHETICA, LEFT: Primary | ICD-10-CM

## 2018-09-12 PROBLEM — E66.01 SEVERE OBESITY (BMI 35.0-39.9): Status: ACTIVE | Noted: 2018-09-12

## 2018-09-12 NOTE — PATIENT INSTRUCTIONS
Office Policies  o Phone calls/patient messages:  Please allow up to 24 hours for someone in the office to contact you about your call or message. Be mindful your provider may be out of the office or your message may require further review. We encourage you to use [employer] for your messages as this is a faster, more efficient way to communicate with our office  o Medication Refills:  Prescription medications require up to 48 business hours to process. We encourage you to use [employer] for your refills. For controlled medications: Please allow up to 72 business hours to process. Certain medications may require you to  a written prescription at our office. NO narcotic/controlled medications will be prescribed after 4pm Monday through Friday or on weekends  o Form/Paperwork Completion:  Please note there is a $25 fee for all paperwork completed by our providers. We ask that you allow 7-14 business days. Pre-payment is due prior to picking up/faxing the completed form. You may also download your forms to [employer] to have your doctor print off.  o Test Results: In order for a patient to obtain test results, an appointment must be made with the physician to review the results. Test results cannot be discussed over the phone.

## 2018-09-12 NOTE — PROGRESS NOTES
HISTORY OF PRESENT ILLNESS  Johanna Barbour is a 51 y.o. male. HPI Comments: This patient is a 57-year-old right-handed -American male who comes in today complaining of numbness and tingling in the left lateral anterior thigh. This is been going on several months. He denies any problem on the right, he denies any leg weakness he denies any pain anywhere else in the leg. He denies bowel or bladder complaints. Has no back pain. He is obese, he works out and is in pretty good shape. 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 He has a history of hyperglycemia but no actual diagnosis of diabetes. He has no family history of diabetes. New Patient   The history is provided by the patient. Review of Systems   Constitutional:        Review of systems is positive for snoring. Complete review of systems done all others negative     Current Outpatient Prescriptions on File Prior to Visit   Medication Sig Dispense Refill    sildenafil citrate (VIAGRA) 100 mg tablet Take 1 Tab by mouth as needed. 6 Tab 11     No current facility-administered medications on file prior to visit. Past Medical History:   Diagnosis Date    Calculus of kidney      Family History   Problem Relation Age of Onset    Hypertension Mother     Hypertension Father      Social History   Substance Use Topics    Smoking status: Never Smoker    Smokeless tobacco: Never Used    Alcohol use No     /78  Pulse 80  Ht 5' 8\" (1.727 m)  Wt 236 lb (107 kg)  SpO2 98%  BMI 35.88 kg/m2    Physical Exam  Constitutional: Oriented to person, place, and time, appears well-developed and well-nourished. No distress. HENT:   Head: Normocephalic and atraumatic. Mouth/Throat: Oropharynx is clear and moist. No oropharyngeal exudate. Eyes: Conjunctivae and EOM are normal. Pupils are equal, round, and reactive to light. No scleral icterus. Neck: Normal range of motion. Neck supple. No thyromegaly present. Cardiovascular: Normal rate, regular rhythm and normal heart sounds. Musculoskeletal: Normal range of motion, exhibits no edema, tenderness or deformity. Lymphadenopathy: no cervical adenopathy. Neurological: Alert and oriented to person, place, and time. Normal strength and normal reflexes. Displays no atrophy and no tremor. No cranial nerve deficit or sensory deficit. Exhibits normal muscle tone. Displays a negative Romberg sign, no seizure activity. Coordination normal, gait normal.   No Babinski's sign on the right side. No Babinski's sign on the left side. Speech, language and mentation are normal  Visual fields are full to confrontation, funduscopic exam reveals flat discs, the retina and vasculature are normal   Skin: Skin is warm and dry. No rash noted, not diaphoretic. No erythema. Psychiatric: Normal mood and affect,  behavior is normal. Judgment and thought content normal.   Vitals reviewed. ASSESSMENT and PLAN  LEFT LEG NUMBNESS  This patient has meralgia paresthetica. 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 I explained to him that this was a nuisance disorder that was often worsened by obesity producing increased pressure on the femoral canal, I also reassured him that this was not going to turn into anything else and there was nothing else going on and worse case he would just put up with it. I explained to him the medications do not make a difference in terms of the pain as this is a physical compressive disorder. He understands this, I will plan to see him back on an as-needed basis. HYPERGLYCEMIA  Even though he has no family history of diabetes he is -American and is at risk of developing diabetes. He understands this and will continue to work with his primary care physician. This note will not be viewable in [address]

## 2018-09-12 NOTE — MR AVS SNAPSHOT
[address] [address], 
[address], [address] [facility] 
[phone] Patient: Chantelle Granados MRN: [id number] :1970 Visit Information Date & Time Provider Department Dept. Phone Encounter #  
 2018  9:00 AM Sandor Escudero MD [facility] [phone] [id number] Upcoming Health Maintenance Date Due DTaP/Tdap/Td series (1 - Tdap) 1990 Influenza Age 5 to Adult 2018 Allergies as of 2018  Review Complete On: 2018 By: Enoch Lira LPN No Known Allergies Current Immunizations  Never Reviewed No immunizations on file. Not reviewed this visit Vitals BP Pulse Height(growth percentile) Weight(growth percentile) SpO2 BMI  
 128/78 80 5' 8\" (1.727 m) 236 lb (107 kg) 98% 35.88 kg/m2 Smoking Status Never Smoker Vitals History BMI and BSA Data Body Mass Index Body Surface Area  
 35.88 kg/m 2 2.27 m 2 Preferred Pharmacy Pharmacy Name Phone [id number] [address] [place] [address] [phone] Your Updated Medication List  
  
   
This list is accurate as of 18  9:23 AM.  Always use your most recent med list.  
  
  
  
  
 sildenafil citrate 100 mg tablet Commonly known as:  VIAGRA Take 1 Tab by mouth as needed. Patient Instructions Office Policies 
o Phone calls/patient messages: Please allow up to 24 hours for someone in the office to contact you about your call or message. Be mindful your provider may be out of the office or your message may require further review. We encourage you to use IndoorAtlas for your messages as this is a faster, more efficient way to communicate with our office 
o Medication Refills: 
Prescription medications require up to 48 business hours to process.  We encourage you to use Mail'Inside for your refills. For controlled medications: Please allow up to 72 business hours to process. Certain medications may require you to  a written prescription at our office. NO narcotic/controlled medications will be prescribed after 4pm Monday through Friday or on weekends 
o Form/Paperwork Completion: 
Please note there is a $25 fee for all paperwork completed by our providers. We ask that you allow 7-14 business days. Pre-payment is due prior to picking up/faxing the completed form. You may also download your forms to Mail'Inside to have your doctor print off. 
o Test Results: In order for a patient to obtain test results, an appointment must be made with the physician to review the results. Test results cannot be discussed over the phone. Introducing \[facility]\"" & [facility] SERVICES! Janelle Rincon introduces Mail'Inside patient portal. Now you can access parts of your medical record, email your doctor's office, and request medication refills online. 1. In your internet browser, go to [email or web address] 2. Click on the First Time User? Click Here link in the Sign In box. You will see the New Member Sign Up page. 3. Enter your Mail'Inside Access Code exactly as it appears below. You will not need to use this code after youve completed the sign-up process. If you do not sign up before the expiration date, you must request a new code. · Mail'Inside Access Code: [email or web address] Expires: 11/4/2018  9:44 AM 
 
4. Enter the last four digits of your Social Security Number (xxxx) and Date of Birth (mm/dd/yyyy) as indicated and click Submit. You will be taken to the next sign-up page. 5. Create a Mail'Inside ID. This will be your Mail'Inside login ID and cannot be changed, so think of one that is secure and easy to remember. 6. Create a Mail'Inside password. You can change your password at any time. 7. Enter your Password Reset Question and Answer.  This can be used at a later time if you forget your password. 8. Enter your e-mail address. You will receive e-mail notification when new information is available in [address] 9. Click Sign Up. You can now view and download portions of your medical record. 10. Click the Download Summary menu link to download a portable copy of your medical information. If you have questions, please visit the Frequently Asked Questions section of the Custom Coup website. Remember, Custom Coup is NOT to be used for urgent needs. For medical emergencies, dial 911. Now available from your iPhone and Android! Please provide this summary of care documentation to your next provider. Your primary care clinician is listed as Asia MACIEL. If you have any questions after today's visit, please call [phone].

## 2018-10-16 ENCOUNTER — OFFICE VISIT (OUTPATIENT)
Dept: INTERNAL MEDICINE CLINIC | Age: 48
End: 2018-10-16

## 2018-10-16 VITALS
SYSTOLIC BLOOD PRESSURE: 142 MMHG | BODY MASS INDEX: 35.92 KG/M2 | DIASTOLIC BLOOD PRESSURE: 80 MMHG | HEIGHT: 68 IN | TEMPERATURE: 97.8 F | HEART RATE: 90 BPM | OXYGEN SATURATION: 100 % | WEIGHT: 237 LBS

## 2018-10-16 DIAGNOSIS — E66.9 OBESITY (BMI 30-39.9): ICD-10-CM

## 2018-10-16 DIAGNOSIS — R06.83 SNORES: Primary | ICD-10-CM

## 2018-10-16 NOTE — PROGRESS NOTES
HISTORY OF PRESENT ILLNESS  Jud Ha is a 51 y.o. male. HPI     C/o small lump under the skin in right abdominal wall  Present 8 months but recently smaller    Wife notes he snores loudly for years, worse recently  No noted apnea  Awakes sometimes several times at night and feels sleepy during the day  a few times a week  Neck size 17    Patient Active Problem List    Diagnosis Date Noted    Severe obesity (BMI 35.0-39.9) 09/12/2018    Skin lesion 03/19/2018    Hyperglycemia 06/04/2014    Obesity 09/24/2013     Current Outpatient Prescriptions   Medication Sig Dispense Refill    sildenafil citrate (VIAGRA) 100 mg tablet Take 1 Tab by mouth as needed. 6 Tab 11     No Known Allergies        ROS    Physical Exam   Constitutional: He appears well-developed and well-nourished. No distress. Obese, nad   HENT:   Head: Normocephalic. Cardiovascular: Normal rate, regular rhythm and normal heart sounds. Exam reveals no gallop and no friction rub. No murmur heard. Pulmonary/Chest: Effort normal and breath sounds normal.   Abdominal: Soft. Musculoskeletal: He exhibits no edema. Neurological: He is alert. Skin:   Tiny subq cystic mass right upper abdominal wall 3-5 mm   Psychiatric: He has a normal mood and affect. Nursing note and vitals reviewed. ASSESSMENT and PLAN  Diagnoses and all orders for this visit:    1. Snores  -     SLEEP MEDICINE REFERRAL    2. Obesity (BMI 30-39.9)  -     REFERRAL TO NUTRITION   I have reviewed/discussed the above normal BMI with the patient. I have recommended the following interventions: dietary management education, guidance, and counseling and encourage exercise . Miles Sanchez     3. Abdominal wall lesion   C/w cyst--reassurance, observe for now    Follow-up Disposition: Not on File

## 2018-10-16 NOTE — PROGRESS NOTES
Chief Complaint   Patient presents with    Mass     RUQ stomach area    Snoring     loud at night , possible apnea

## 2018-10-16 NOTE — MR AVS SNAPSHOT
[address] [facility] 83. 
[phone] Patient: Hazel Gregorio MRN: [id number] :1970 Visit Information Date & Time Provider Department Dept. Phone Encounter #  
 10/16/2018  2:30 PM David Hooks, [address] [place] [address],[address] [phone] [id number] Upcoming Health Maintenance Date Due DTaP/Tdap/Td series (1 - Tdap) 1990 Influenza Age 5 to Adult 2018 Allergies as of 10/16/2018  Review Complete On: 10/16/2018 By: Ilda Rogers LPN No Known Allergies Current Immunizations  Never Reviewed No immunizations on file. Not reviewed this visit You Were Diagnosed With   
  
 Codes Comments Snores    -  Primary ICD-10-CM: R06.83 
ICD-9-CM: 786.09 Obesity (BMI 30-39. 9)     ICD-10-CM: E66.9 ICD-9-CM: 278.00 Vitals BP Pulse Temp Height(growth percentile) Weight(growth percentile) SpO2  
 142/80 (BP 1 Location: Left arm, BP Patient Position: Sitting) 90 97.8 °F (36.6 °C) (Oral) 5' 8\" (1.727 m) 237 lb (107.5 kg) 100% BMI Smoking Status 36.04 kg/m2 Never Smoker BMI and BSA Data Body Mass Index Body Surface Area 36.04 kg/m 2 2.27 m 2 Preferred Pharmacy Pharmacy Name Phone [phone] [phone] Your Updated Medication List  
  
   
This list is accurate as of 10/16/18  3:01 PM.  Always use your most recent med list.  
  
  
  
  
 sildenafil citrate 100 mg tablet Commonly known as:  VIAGRA Take 1 Tab by mouth as needed. We Performed the Following SLEEP MEDICINE REFERRAL [GSP179 Custom] Comments:  
 Orders: 
Sleep Medicine Consult - Schedule patient for a sleep specialist consult. If appropriate, schedule patient for sleep study(s). Initiate treatment if needed. Forward correspondance to my office. Referral Information Referral ID Referred By Referred To  
  
 [id number] Tory MACIEL MD   
   [address] [address] [address] [facility], [address] Phone: [phone] Fax: [phone] Visits Status Start Date End Date 1 New Request 10/16/18 10/16/19 If your referral has a status of pending review or denied, additional information will be sent to support the outcome of this decision. Introducing [facility] & [facility] SERVICES! [employer] introduces Pivotstream patient portal. Now you can access parts of your medical record, email your doctor's office, and request medication refills online. 1. In your internet browser, go to [email or web address] 2. Click on the First Time User? Click Here link in the Sign In box. You will see the New Member Sign Up page. 3. Enter your Pivotstream Access Code exactly as it appears below. You will not need to use this code after youve completed the sign-up process. If you do not sign up before the expiration date, you must request a new code. · Pivotstream Access Code: [email or web address] Expires: 11/4/2018  9:44 AM 
 
4. Enter the last four digits of your Social Security Number (xxxx) and Date of Birth (mm/dd/yyyy) as indicated and click Submit. You will be taken to the next sign-up page. 5. Create a Pivotstream ID. This will be your Pivotstream login ID and cannot be changed, so think of one that is secure and easy to remember. 6. Create a Pivotstream password. You can change your password at any time. 7. Enter your Password Reset Question and Answer. This can be used at a later time if you forget your password. 8. Enter your e-mail address. You will receive e-mail notification when new information is available in [address] 9. Click Sign Up. You can now view and download portions of your medical record. 10. Click the Download Summary menu link to download a portable copy of your medical information. If you have questions, please visit the Frequently Asked Questions section of the [employer] website. Remember, WiserTogether is NOT to be used for urgent needs. For medical emergencies, dial 911. Now available from your iPhone and Android! Please provide this summary of care documentation to your next provider. Your primary care clinician is listed as Rosey MACIEL. If you have any questions after today's visit, please call [phone].

## 2019-01-29 ENCOUNTER — OFFICE VISIT (OUTPATIENT)
Dept: SLEEP MEDICINE | Age: 49
End: 2019-01-29

## 2019-01-29 VITALS
OXYGEN SATURATION: 98 % | DIASTOLIC BLOOD PRESSURE: 77 MMHG | BODY MASS INDEX: 36.68 KG/M2 | HEART RATE: 97 BPM | WEIGHT: 242 LBS | HEIGHT: 68 IN | SYSTOLIC BLOOD PRESSURE: 127 MMHG

## 2019-01-29 DIAGNOSIS — G47.33 OSA (OBSTRUCTIVE SLEEP APNEA): Primary | ICD-10-CM

## 2019-01-29 NOTE — PATIENT INSTRUCTIONS

## 2019-01-29 NOTE — PROGRESS NOTES
217 [facility] [place]. [place], [address]  Tel.  [phone]  Fax. 100 [facility] 60  [place], [address] [facility] [address] [facility] [address]  Tel.  [phone]  Fax. [phone] 9[address] [facility] Kami Nesbitt  Tel.  [phone]  Fax. [phone]       Chief Complaint       Chief Complaint   Patient presents with    Sleep Problem     Np refd by Dr. Caroline Newby for snoring,obesity and daytime fatigue       HPI      Alma Khanna is a  51 y.o.  male seen for evaluation of a sleep disorder  . The patient reports he has experienced snoring, nocturnal awakening. The patient retires at 10 pm and awakens at 7 am. The patient notes that he will experience frequent awakening from sleep. In general he is able to return to sleep after awakening. He may awaken 3 times during the night. He tends to awaken spontaneously. His wife has told him of loud snoring which is more pronounced when supine. He is unable to comment on potential apnea. He does not have a history of sleep talking or sleepwalking, bruxism or incontinence, vivid dreaming or nightmares, hypnagogic hallucinations, abnormal arm or leg movements, sleep paralysis or cataplexy. The patient has not undergone diagnostic testing for the current problems. [place] Sleepiness Score: 4       No Known Allergies    Current Outpatient Medications   Medication Sig Dispense Refill    sildenafil citrate (VIAGRA) 100 mg tablet Take 1 Tab by mouth as needed. 6 Tab 11        He  has a past medical history of Calculus of kidney. He  has a past surgical history that includes hx urological and hx other surgical (Left, 03/22/2018). He family history includes Hypertension in his father and mother. He  reports that  has never smoked. he has never used smokeless tobacco. He reports that he does not drink alcohol or use drugs. Review of Systems:  Review of Systems   Constitutional: Negative for chills and fever.    HENT: Negative for hearing loss and tinnitus. Eyes: Negative for blurred vision and double vision. Respiratory: Negative for cough and shortness of breath. Cardiovascular: Negative for chest pain and palpitations. Gastrointestinal: Negative for abdominal pain and heartburn. Genitourinary: Negative for frequency and urgency. Musculoskeletal: Negative for back pain and neck pain. Skin: Negative for itching and rash. Neurological: Negative for dizziness and headaches. Psychiatric/Behavioral: Negative for depression. Objective:     Visit Vitals  /77 (BP 1 Location: Left arm, BP Patient Position: Sitting)   Pulse 97   Ht 5' 8\" (1.727 m)   Wt 242 lb (109.8 kg)   SpO2 98%   BMI 36.80 kg/m²     Body mass index is 36.8 kg/m². General:   Conversant, cooperative   Eyes:  Pupils equal and reactive, no nystagmus   Oropharynx:   Mallampati score IV,  tongue scalloped   Tonsils:      Neck:   No carotid bruits; Neck circ. in \"inches\": 18   Chest/Lungs:  Clear on auscultation    CVS:  Normal rate, regular rhythm   Skin:  Warm to touch; no obvious rashes   Neuro:  Speech fluent, face symmetrical, tongue movement normal   Psych:  Normal affect,  normal countenance        Assessment:       ICD-10-CM ICD-9-CM    1. AMARI (obstructive sleep apnea) G47.33 327.23      History of prominent snoring with nocturnal awakening consistent with sleep disordered breathing. The patient does have a small oral airway. He will be evaluated with a home sleep test.  The results will be reviewed with him. Plan:     No orders of the defined types were placed in this encounter. * Patient has a history and examination consistent with the diagnosis of sleep apnea. *Home sleep testing was ordered for initial evaluation. * He was provided information on sleep apnea including corresponding risk factors and the importance of proper treatment. * Treatment options if indicated were reviewed today. Instructions:  o The patient would benefit from weight reduction measures. o Do not engage in activities requiring a normal degree of alertness if fatigue is present. o The patient understands that untreated or undertreated sleep apnea could impair judgement and the ability to function normally during the day.  o Call or return if symptoms worsen or persist.          Vanessa Skinner MD, [facility]  Electronically signed 01/29/19       This note was created using voice recognition software. Despite editing, there may be syntax errors. This note will not be viewable in [address]

## 2019-01-30 ENCOUNTER — DOCUMENTATION ONLY (OUTPATIENT)
Dept: SLEEP MEDICINE | Age: 49
End: 2019-01-30

## 2019-02-04 ENCOUNTER — DOCUMENTATION ONLY (OUTPATIENT)
Dept: SLEEP MEDICINE | Age: 49
End: 2019-02-04

## 2019-02-04 NOTE — PROGRESS NOTES
This note is being routed to Dr. Ron Hidalgo. Sleep Medicine consult note and sleep study report in patient's chart for review.     Thank you for the referral.

## 2019-02-12 ENCOUNTER — DOCUMENTATION ONLY (OUTPATIENT)
Dept: SLEEP MEDICINE | Age: 49
End: 2019-02-12

## 2019-02-13 ENCOUNTER — TELEPHONE (OUTPATIENT)
Dept: SLEEP MEDICINE | Age: 49
End: 2019-02-13

## 2019-02-26 ENCOUNTER — TELEPHONE (OUTPATIENT)
Dept: SLEEP MEDICINE | Age: 49
End: 2019-02-26

## 2019-02-26 NOTE — TELEPHONE ENCOUNTER
P2P needed for patient to get his Auto Cpap Device due to low AHI on study. P2P can be done with at [phone].

## 2019-03-19 ENCOUNTER — DOCUMENTATION ONLY (OUTPATIENT)
Dept: SLEEP MEDICINE | Age: 49
End: 2019-03-19

## 2019-03-19 NOTE — TELEPHONE ENCOUNTER
Spoke to Lam wang [facility] who stated the case closed on 02/28/2019 and that we would would need to restart the case. I will resend patient information to start this process to [facility]!

## 2019-10-07 ENCOUNTER — OFFICE VISIT (OUTPATIENT)
Dept: SLEEP MEDICINE | Age: 49
End: 2019-10-07

## 2019-10-07 VITALS
DIASTOLIC BLOOD PRESSURE: 77 MMHG | SYSTOLIC BLOOD PRESSURE: 113 MMHG | WEIGHT: 235 LBS | HEIGHT: 68 IN | BODY MASS INDEX: 35.61 KG/M2 | HEART RATE: 89 BPM | OXYGEN SATURATION: 96 %

## 2019-10-07 DIAGNOSIS — G47.33 OSA (OBSTRUCTIVE SLEEP APNEA): Primary | ICD-10-CM

## 2019-10-07 NOTE — PROGRESS NOTES
217 [facility]. [place], [address]  Tel.  [phone]  Fax. 100 [facility] 60  [place], [address] [facility]  Tel.  [phone]  Fax. [phone] 9250 [facility] Kami Nesbitt   Tel.  [phone]  Fax. [phone]         Chief Complaint       Chief Complaint   Patient presents with    Sleep Problem     adherence visit         HPI        Mitchell Winters is a 47 y.o. male seen for follow-up. He was evaluated with a home sleep test which  demonstrated sleep disordered breathing characterized by an AHI of 8.7/h with minimal SaO2 of 83%. Events were more prominent supine with the supine-related AHI of 11.7/h. Snoring during 5.7% of the recording.     APAP 6-15 cm was recommended. This was not initially improved by his insurance carrier. In the interim, he has taken weight loss measures. He has lost approximately 7 pounds since his initial assessment. He has been told that the snoring is less severe. He is not fatigued on awakening but does have some fatigue during the day. No Known Allergies    Current Outpatient Medications   Medication Sig Dispense Refill    sildenafil citrate (VIAGRA) 100 mg tablet Take 1 Tab by mouth as needed. 6 Tab 11        He  has a past medical history of Calculus of kidney. He  has a past surgical history that includes hx urological and hx other surgical (Left, 03/22/2018). He family history includes Hypertension in his father and mother. He  reports that he has never smoked. He has never used smokeless tobacco. He reports that he does not drink alcohol or use drugs. Review of Systems:  Unchanged per patient      Objective:     Visit Vitals  /77   Pulse 89   Ht 5' 8\" (1.727 m)   Wt 235 lb (106.6 kg)   SpO2 96%   BMI 35.73 kg/m²     Body mass index is 35.73 kg/m².      General:   Conversant, cooperative   Eyes:  Pupils equal and reactive, no nystagmus   Oropharynx:   Mallampati score IV,  tongue scalloped            Chest/Lungs:  Clear on auscultation    CVS:  Normal rate, regular rhythm        Neuro:  Speech fluent, face symmetrical             Assessment:       ICD-10-CM ICD-9-CM    1. AMARI (obstructive sleep apnea) G47.33 327.23      Mild sleep disordered breathing, more prominent supine, treatment options were again discussed. He will continue weight loss measures. Techniques to limit supine sleep were also reviewed. He will contact the office if symptoms become more pronounced. Plan:   No orders of the defined types were placed in this encounter. * Patient has a history and examination consistent with the diagnosis of sleep apnea. * He was provided information on sleep apnea including corresponding risk factors and the importance of proper treatment. * Treatment options if indicated were reviewed today. Potential benefit of weight reduction    Manisha Lemus MD, [facility]  Electronically signed 10/07/19        This note was created using voice recognition software. Despite editing, there may be syntax errors. This note will not be viewable in [address]

## 2019-10-07 NOTE — PATIENT INSTRUCTIONS

## 2019-10-23 PROBLEM — G47.33 OSA (OBSTRUCTIVE SLEEP APNEA): Status: ACTIVE | Noted: 2019-10-23

## 2019-10-23 PROBLEM — N52.9 ED (ERECTILE DYSFUNCTION): Status: ACTIVE | Noted: 2019-10-23

## 2019-10-23 PROBLEM — R97.20 ELEVATED PSA: Status: ACTIVE | Noted: 2019-10-23

## 2019-10-24 ENCOUNTER — OFFICE VISIT (OUTPATIENT)
Dept: INTERNAL MEDICINE CLINIC | Age: 49
End: 2019-10-24

## 2019-10-24 VITALS
DIASTOLIC BLOOD PRESSURE: 75 MMHG | HEIGHT: 68 IN | WEIGHT: 237 LBS | HEART RATE: 79 BPM | RESPIRATION RATE: 16 BRPM | BODY MASS INDEX: 35.92 KG/M2 | TEMPERATURE: 97.8 F | SYSTOLIC BLOOD PRESSURE: 113 MMHG | OXYGEN SATURATION: 98 %

## 2019-10-24 DIAGNOSIS — R73.03 PREDIABETES: Primary | ICD-10-CM

## 2019-10-24 DIAGNOSIS — G47.33 OSA (OBSTRUCTIVE SLEEP APNEA): ICD-10-CM

## 2019-10-24 DIAGNOSIS — R97.20 ELEVATED PSA: ICD-10-CM

## 2019-10-24 DIAGNOSIS — E66.9 OBESITY (BMI 30-39.9): ICD-10-CM

## 2019-10-24 NOTE — PATIENT INSTRUCTIONS
Office Policies    Phone calls/patient messages:            Please allow up to 24 hours for someone in the office to contact you about your call or message. Be mindful your provider may be out of the office or your message may require further review. We encourage you to use [email or web address] for your messages as this is a faster, more efficient way to communicate with our office                         Medication Refills:            Prescription medications require 48-72 business hours to process. We encourage you to use [email or web address] for your refills. For controlled medications: Please allow 72 business hours to process. Certain medications may require you to  a written prescription at our office. NO narcotic/controlled medications will be prescribed after 4pm Monday through Friday or on weekends              Form/Paperwork Completion:            Please note a $25 fee may incur for all paperwork for completed by our providers. We ask that you allow 7-10 business days. Pre-payment is due prior to picking up/faxing the completed form. You may also download your forms to Cream.HR to have your doctor print off. Cream.HR Activation    Thank you for requesting access to Cream.HR. Please follow the instructions below to securely access and download your online medical record. Cream.HR allows you to send messages to your doctor, view your test results, renew your prescriptions, schedule appointments, and more. How Do I Sign Up? 1. In your internet browser, go to [email or web address]  2. Click on the First Time User? Click Here link in the Sign In box. You will be redirect to the New Member Sign Up page. 3. Enter your Cream.HR Access Code exactly as it appears below. You will not need to use this code after youve completed the sign-up process. If you do not sign up before the expiration date, you must request a new code.     Cream.HR Access Code: Annia Gordon  Expires: 12/8/2019  9:28 AM (This is the date your FOODit access code will )    4. Enter the last four digits of your Social Security Number (xxxx) and Date of Birth (mm/dd/yyyy) as indicated and click Submit. You will be taken to the next sign-up page. 5. Create a [employer] ID. This will be your FOODit login ID and cannot be changed, so think of one that is secure and easy to remember. 6. Create a FOODit password. You can change your password at any time. 7. Enter your Password Reset Question and Answer. This can be used at a later time if you forget your password. 8. Enter your e-mail address. You will receive e-mail notification when new information is available in [address] 9. Click Sign Up. You can now view and download portions of your medical record. 10. Click the Download Summary menu link to download a portable copy of your medical information. Additional Information    If you have questions, please visit the Frequently Asked Questions section of the FOODit website at [email or web address] Remember, FOODit is NOT to be used for urgent needs. For medical emergencies, dial 911.

## 2019-10-24 NOTE — PROGRESS NOTES
HISTORY OF PRESENT ILLNESS  Princess Haji is a 47 y.o. male. HPI       hx obesity prediabetes  Sees RACHANA VELASCO for elevated PSA yearly  Dx with mild AMARI--weight loss has helped. Did not need cpap  Lost 8 lbs mostly with martial arts classes  Feels well  Works in [address] small lump under the skin in right abdominal wall  Present 8 months but recently smaller     Wife notes he snores loudly for years, worse recently  No noted apnea  Awakes sometimes several times at night and feels sleepy during the day  a few times a week  Neck size 17       Patient Active Problem List    Diagnosis Date Noted    Severe obesity (BMI 35.0-39.9) 09/12/2018    Skin lesion 03/19/2018    Hyperglycemia 06/04/2014    Obesity 09/24/2013     Current Outpatient Medications   Medication Sig Dispense Refill    sildenafil citrate (VIAGRA) 100 mg tablet Take 1 Tab by mouth as needed.  6 Tab 11     No Known Allergies   Lab Results   Component Value Date/Time    WBC 8.5 08/06/2018 10:00 AM    HGB 15.7 08/06/2018 10:00 AM    HCT 45.0 08/06/2018 10:00 AM    PLATELET 758 72/87/7570 10:00 AM    MCV 91 08/06/2018 10:00 AM     Lab Results   Component Value Date/Time    Hemoglobin A1c 6.1 (H) 08/06/2018 10:00 AM    Hemoglobin A1c 6.1 (H) 08/03/2016 11:00 AM    Hemoglobin A1c 6.0 (H) 06/03/2014 10:17 AM    Glucose 90 08/06/2018 10:00 AM    LDL, calculated 124 (H) 08/06/2018 10:00 AM    Creatinine 1.26 08/06/2018 10:00 AM      Lab Results   Component Value Date/Time    Cholesterol, total 188 08/06/2018 10:00 AM    HDL Cholesterol 51 08/06/2018 10:00 AM    LDL, calculated 124 (H) 08/06/2018 10:00 AM    Triglyceride 64 08/06/2018 10:00 AM     Lab Results   Component Value Date/Time    GFR est non-AA 67 08/06/2018 10:00 AM    GFR est AA 77 08/06/2018 10:00 AM    Creatinine 1.26 08/06/2018 10:00 AM    BUN 11 08/06/2018 10:00 AM    Sodium 138 08/06/2018 10:00 AM    Potassium 4.9 08/06/2018 10:00 AM    Chloride 100 08/06/2018 10:00 AM    CO2 25 08/06/2018 10:00 AM        ROS    Physical Exam   Constitutional: He appears well-developed and well-nourished. No distress. Appears stated age, obese, nad   HENT:   Head: Normocephalic. Cardiovascular: Normal rate, regular rhythm and normal heart sounds. Exam reveals no gallop and no friction rub. No murmur heard. Pulmonary/Chest: Effort normal and breath sounds normal. No respiratory distress. He has no wheezes. He has no rales. He exhibits no tenderness. Abdominal: Soft. He exhibits no distension and no mass. There is no tenderness. There is no rebound and no guarding. Musculoskeletal: He exhibits no edema. Neurological: He is alert. Psychiatric: He has a normal mood and affect. Nursing note and vitals reviewed. ASSESSMENT and PLAN  Diagnoses and all orders for this visit:    1. Prediabetes  -     CBC W/O DIFF  -     METABOLIC PANEL, COMPREHENSIVE  -     LIPID PANEL  -     TSH 3RD GENERATION  -     HEMOGLOBIN A1C WITH EAG   Weight reduction recommended   Low card diet and carb counting discussion  2. AMARI (obstructive sleep apnea)   Mild, not requiring cpap   Weight reduction  3. Obesity (BMI 30-39. 9)   I have reviewed/discussed the above normal BMI with the patient. I have recommended the following interventions: dietary management education, guidance, and counseling and encourage exercise . Onel Donaldson 4. Elevated PSA   Repeat in 6 months    Follow-up and Dispositions    · Return in about 6 months (around 4/24/2020) for cpe.

## 2019-10-25 LAB
ALBUMIN SERPL-MCNC: 4.6 G/DL (ref 3.5–5.5)
ALBUMIN/GLOB SERPL: 1.9 {RATIO} (ref 1.2–2.2)
ALP SERPL-CCNC: 82 IU/L (ref 39–117)
ALT SERPL-CCNC: 22 IU/L (ref 0–44)
AST SERPL-CCNC: 22 IU/L (ref 0–40)
BILIRUB SERPL-MCNC: 0.6 MG/DL (ref 0–1.2)
BUN SERPL-MCNC: 10 MG/DL (ref 6–24)
BUN/CREAT SERPL: 7 (ref 9–20)
CALCIUM SERPL-MCNC: 9.4 MG/DL (ref 8.7–10.2)
CHLORIDE SERPL-SCNC: 101 MMOL/L (ref 96–106)
CHOLEST SERPL-MCNC: 202 MG/DL (ref 100–199)
CO2 SERPL-SCNC: 22 MMOL/L (ref 20–29)
CREAT SERPL-MCNC: 1.34 MG/DL (ref 0.76–1.27)
ERYTHROCYTE [DISTWIDTH] IN BLOOD BY AUTOMATED COUNT: 13.1 % (ref 12.3–15.4)
EST. AVERAGE GLUCOSE BLD GHB EST-MCNC: 123 MG/DL
GLOBULIN SER CALC-MCNC: 2.4 G/DL (ref 1.5–4.5)
GLUCOSE SERPL-MCNC: 85 MG/DL (ref 65–99)
HBA1C MFR BLD: 5.9 % (ref 4.8–5.6)
HCT VFR BLD AUTO: 46.1 % (ref 37.5–51)
HDLC SERPL-MCNC: 57 MG/DL
HGB BLD-MCNC: 16 G/DL (ref 13–17.7)
LDLC SERPL CALC-MCNC: 121 MG/DL (ref 0–99)
MCH RBC QN AUTO: 32.1 PG (ref 26.6–33)
MCHC RBC AUTO-ENTMCNC: 34.7 G/DL (ref 31.5–35.7)
MCV RBC AUTO: 93 FL (ref 79–97)
PLATELET # BLD AUTO: 295 X10E3/UL (ref 150–450)
POTASSIUM SERPL-SCNC: 4.6 MMOL/L (ref 3.5–5.2)
PROT SERPL-MCNC: 7 G/DL (ref 6–8.5)
RBC # BLD AUTO: 4.98 X10E6/UL (ref 4.14–5.8)
SODIUM SERPL-SCNC: 142 MMOL/L (ref 134–144)
TRIGL SERPL-MCNC: 118 MG/DL (ref 0–149)
TSH SERPL DL<=0.005 MIU/L-ACNC: 1.08 UIU/ML (ref 0.45–4.5)
VLDLC SERPL CALC-MCNC: 24 MG/DL (ref 5–40)
WBC # BLD AUTO: 9.5 X10E3/UL (ref 3.4–10.8)

## 2020-04-23 NOTE — PROGRESS NOTES
HISTORY OF PRESENT ILLNESS  Kate Canas is a 45 y.o. male. HPI   Kate Canas is a 45 y.o. male being evaluated by a Virtual Visit (video visit) encounter to address concerns as mentioned above. A caregiver was present when appropriate. Due to this being a TeleHealth encounter ([facility]), evaluation of the following organ systems was limited: Vitals/Constitutional/EENT/Resp/CV/GI//MS/Neuro/Skin/Heme-Lymph-Imm. Pursuant to the emergency declaration under the [facility], [address] [place] [address] and the [employer] and [employer] Act, this Virtual Visit was conducted with patient's (and/or legal guardian's) consent, to reduce the risk of exposure to COVID-19 and provide necessary medical care. Services were provided through a video synchronous discussion virtually to substitute for in-person encounter. --Dedra Ngo MD on 4/23/2020 at 4:34 PM    An electronic signature was used to authenticate this note. VV for CPE and f/u prediabetes AMARI-not on cpap,  obesity  Weight -nonrecently  PSA last year 1.9-Dr Randhawa-no bph sxs  Exercises 3 d per week    Last OV       hx obesity prediabetes  Sees RACHANA VELASCO for elevated PSA yearly  Dx with mild AMARI--weight loss has helped. Did not need cpap  Lost 8 lbs mostly with martial arts classes  Feels well  Works in IT    Patient Active Problem List   Diagnosis Code    Obesity E66.9    Hyperglycemia R73.9    Skin lesion L98.9    Severe obesity (BMI 35.0-39. 9) E66.01    ED (erectile dysfunction) N52.9    Elevated PSA R97.20    AMARI (obstructive sleep apnea) G47.33     Current Outpatient Medications   Medication Sig Dispense Refill    sildenafil citrate (VIAGRA) 100 mg tablet Take 1 Tab by mouth as needed.  6 Tab 11     No Known Allergies   Lab Results   Component Value Date/Time    WBC 9.5 10/24/2019 10:23 AM    HGB 16.0 10/24/2019 10:23 AM    HCT 46.1 10/24/2019 10:23 AM    PLATELET 186 12/22/0247 10:23 AM    MCV 93 10/24/2019 10:23 AM     Lab Results   Component Value Date/Time    Hemoglobin A1c 5.9 (H) 10/24/2019 10:23 AM    Hemoglobin A1c 6.1 (H) 08/06/2018 10:00 AM    Hemoglobin A1c 6.1 (H) 08/03/2016 11:00 AM    Glucose 85 10/24/2019 10:23 AM    LDL, calculated 121 (H) 10/24/2019 10:23 AM    Creatinine 1.34 (H) 10/24/2019 10:23 AM      Lab Results   Component Value Date/Time    Cholesterol, total 202 (H) 10/24/2019 10:23 AM    HDL Cholesterol 57 10/24/2019 10:23 AM    LDL, calculated 121 (H) 10/24/2019 10:23 AM    Triglyceride 118 10/24/2019 10:23 AM     Lab Results   Component Value Date/Time    GFR est non-AA 61 10/24/2019 10:23 AM    GFR est AA 71 10/24/2019 10:23 AM    Creatinine 1.34 (H) 10/24/2019 10:23 AM    BUN 10 10/24/2019 10:23 AM    Sodium 142 10/24/2019 10:23 AM    Potassium 4.6 10/24/2019 10:23 AM    Chloride 101 10/24/2019 10:23 AM    CO2 22 10/24/2019 10:23 AM        ROS    Physical Exam  Constitutional:       Appearance: Normal appearance. He is obese. Pulmonary:      Effort: Pulmonary effort is normal.   Neurological:      Mental Status: He is alert. ASSESSMENT and PLAN  Diagnoses and all orders for this visit:    1. Routine general medical examination at a health care facility  -     LIPID PANEL  -     REFERRAL TO GASTROENTEROLOGY  -     METABOLIC PANEL, COMPREHENSIVE  -     PSA W/ REFLX FREE PSA  -     URINALYSIS W/ RFLX MICROSCOPIC   Advised weight reduction -diet and  Exercise   recommended tdap and shingrix  2. Prediabetes  -     HEMOGLOBIN A1C WITH EAG    3. Obesity (BMI 30-39. 9)   I have reviewed/discussed the above normal BMI with the patient. I have recommended the following interventions: dietary management education, guidance, and counseling and encourage exercise . .    4. AMARI (obstructive sleep apnea)   Weight reduction    5.  Hx elevated PSA   See RACHANA VELASCO prn   No hx bph

## 2020-04-24 ENCOUNTER — VIRTUAL VISIT (OUTPATIENT)
Dept: INTERNAL MEDICINE CLINIC | Age: 50
End: 2020-04-24

## 2020-04-24 DIAGNOSIS — G47.33 OSA (OBSTRUCTIVE SLEEP APNEA): ICD-10-CM

## 2020-04-24 DIAGNOSIS — Z00.00 ROUTINE GENERAL MEDICAL EXAMINATION AT A HEALTH CARE FACILITY: Primary | ICD-10-CM

## 2020-04-24 DIAGNOSIS — E66.9 OBESITY (BMI 30-39.9): ICD-10-CM

## 2020-04-24 DIAGNOSIS — R73.03 PREDIABETES: ICD-10-CM

## 2020-04-24 NOTE — PATIENT INSTRUCTIONS
This is an established visit conducted via telemedicine. The patient has been instructed that this meets HIPAA criteria and acknowledges and agrees to this method of visitation. Cosme Harvey [place] 
23/80/98 
4:82 AM 
Chief Complaint Patient presents with  Complete Physical  
  Annual  
[facility] Mail orders with map

## 2020-10-22 NOTE — PROGRESS NOTES
HISTORY OF PRESENT ILLNESS  Lito Howell is a 45 y.o. male. HPI     Lito Howell is a 45 y.o. male being evaluated by a Virtual Visit (video visit) encounter to address concerns as mentioned above. A caregiver was present when appropriate. Due to this being a TeleHealth encounter (During [facility] public health emergency), evaluation of the following organ systems was limited: Vitals/Constitutional/EENT/Resp/CV/GI//MS/Neuro/Skin/Heme-Lymph-Imm. Pursuant to the emergency declaration under the [facility], [address] [place] [address] and the [place] and [employer] Act, this Virtual Visit was conducted with patient's (and/or legal guardian's) consent, to reduce the risk of exposure to COVID-19 and provide necessary medical care. Services were provided through a video synchronous discussion virtually to substitute for in-person encounter. --Robbin Stewart MD on 10/22/2020 at 11:16 AM    An electronic signature was used to authenticate this note. Fu prediabetes, janet not on cpap and obesity  Did not get labs earlier this year  Weight--236 lb  Left leg lateral hip area sometimes experiences sharp painand numbness  No back pain  Saw Neurologist last year -had MRI and EMG--neg vasquez.  Weight loss recommended  Saw urologist this year--PSA down to 0.7  Had colonoscopy last month--no polyps [facility]     VV for CPE and f/u prediabetes JANET-not on cpap,  obesity  Weight -nonrecently  PSA last year 1.9-Dr Randhawa-no bph sxs  Exercises 3 d per week    Patient Active Problem List    Diagnosis Date Noted    ED (erectile dysfunction) 10/23/2019    Elevated PSA 10/23/2019    JANET (obstructive sleep apnea) 10/23/2019    Severe obesity (BMI 35.0-39.9) 09/12/2018    Skin lesion 03/19/2018    Hyperglycemia 06/04/2014    Obesity 09/24/2013     Current Outpatient Medications   Medication Sig Dispense Refill    sildenafil citrate (VIAGRA) 100 mg tablet Take 1 Tab by mouth as needed. 6 Tab 11     No Known Allergies   Lab Results   Component Value Date/Time    WBC 9.5 10/24/2019 10:23 AM    HGB 16.0 10/24/2019 10:23 AM    HCT 46.1 10/24/2019 10:23 AM    PLATELET 494 52/17/5425 10:23 AM    MCV 93 10/24/2019 10:23 AM     Lab Results   Component Value Date/Time    Hemoglobin A1c 5.9 (H) 10/24/2019 10:23 AM    Hemoglobin A1c 6.1 (H) 08/06/2018 10:00 AM    Hemoglobin A1c 6.1 (H) 08/03/2016 11:00 AM    Glucose 85 10/24/2019 10:23 AM    LDL, calculated 121 (H) 10/24/2019 10:23 AM    Creatinine 1.34 (H) 10/24/2019 10:23 AM      Lab Results   Component Value Date/Time    Cholesterol, total 202 (H) 10/24/2019 10:23 AM    HDL Cholesterol 57 10/24/2019 10:23 AM    LDL, calculated 121 (H) 10/24/2019 10:23 AM    Triglyceride 118 10/24/2019 10:23 AM     Lab Results   Component Value Date/Time    GFR est non-AA 61 10/24/2019 10:23 AM    GFR est AA 71 10/24/2019 10:23 AM    Creatinine 1.34 (H) 10/24/2019 10:23 AM    BUN 10 10/24/2019 10:23 AM    Sodium 142 10/24/2019 10:23 AM    Potassium 4.6 10/24/2019 10:23 AM    Chloride 101 10/24/2019 10:23 AM    CO2 22 10/24/2019 10:23 AM        ROS    Physical Exam  Constitutional:       Appearance: Normal appearance. He is obese. Pulmonary:      Effort: Pulmonary effort is normal.   Neurological:      General: No focal deficit present. Mental Status: He is alert. ASSESSMENT and PLAN  Diagnoses and all orders for this visit:    1. Prediabetes  -     CBC W/O DIFF  -     METABOLIC PANEL, COMPREHENSIVE  -     LIPID PANEL  -     TSH 3RD GENERATION  -     HEMOGLOBIN A1C WITH EAG  -     VITAMIN D, 25 HYDROXY   Refer to CDE per pt request     2. Pure hypercholesterolemia  -     VITAMIN D, 25 HYDROXY   Lipid panel  3. Obesity (BMI 30-39. 9)   I have reviewed/discussed the above normal BMI with the patient.   I have recommended the following interventions: dietary management education, guidance, and counseling and encourage exercise . .    4. Hx Elevated PSA   Fu Urologist yearly    5. Left Hip Pain   Neg neuro vasquez   wieght reduction   Ice harjit , otc nsaids for tendonitis  6. AMARI   Weight reduction    7.  Preventive   Recommended flu shot , tdap ,  shingrix    rtc 1 year CPE

## 2020-10-23 ENCOUNTER — VIRTUAL VISIT (OUTPATIENT)
Dept: INTERNAL MEDICINE CLINIC | Age: 50
End: 2020-10-23
Payer: COMMERCIAL

## 2020-10-23 DIAGNOSIS — R73.03 PREDIABETES: Primary | ICD-10-CM

## 2020-10-23 DIAGNOSIS — E78.00 PURE HYPERCHOLESTEROLEMIA: ICD-10-CM

## 2020-10-23 DIAGNOSIS — E66.9 OBESITY (BMI 30-39.9): ICD-10-CM

## 2020-10-23 PROCEDURE — 99214 OFFICE O/P EST MOD 30 MIN: CPT | Performed by: INTERNAL MEDICINE

## 2020-10-23 NOTE — PATIENT INSTRUCTIONS
This is an established visit conducted via telemedicine. The patient has been instructed that this meets HIPAA criteria and acknowledges and agrees to this method of visitation.  
 
Holly Nick LPN 
04/60/81 
1:81 AM

## 2020-10-26 ENCOUNTER — PATIENT OUTREACH (OUTPATIENT)
Dept: INTERNAL MEDICINE CLINIC | Age: 50
End: 2020-10-26

## 2020-11-20 ENCOUNTER — PATIENT OUTREACH (OUTPATIENT)
Dept: INTERNAL MEDICINE CLINIC | Age: 50
End: 2020-11-20

## 2020-11-20 DIAGNOSIS — R73.03 PREDIABETES: Primary | ICD-10-CM

## 2020-11-20 NOTE — PATIENT INSTRUCTIONS
Goals/Plan:  -A1c was 5.9% on 10/24/19; you had your labs done recently  -you want to lose weight by making some lifestyle modifications  -you want to add more protein and balance meals  -add non starchy veggies and fruit   -eliminate juices and lemonade  -strive to start following the healthy plate meal plan being mindful of what is a carb (fruit, dairy, grains, starchy vegetables) and portion size. As a rule, 1/2 cup of a carb food is a serving size (15 grams).  The guideline is up to 45-60 grams of carb per meal (3 meals per day) or 3-4 servings per meal.  A snack guideline is- 1 oz. protein serving and may add 1 carb serving (15 grams)  -start reading nutrition labels  -continue exercising at home and at work, keeping in mind the goal is 150 minutes (2 & 1/2 hours) of moderate exercise weekly like a brisk walk  -follow up with Dr Christopher Hernandez next April  -call Leatha Day at [phone] with any questions

## 2020-11-20 NOTE — ACP (ADVANCE CARE PLANNING)
Advance Care Planning - not on file; education provided      Primary Decision Maker: Hector David [facility] - [phone]

## 2020-11-20 NOTE — PROGRESS NOTES
Was asked by Dr Malaika Galvez to provide patient education for prediabetes and obesity. Education was provided telephonically. Last A1c was 5.9% on 10/24/19. A1c ordered in April and October this year not done. Pt stated he had labs done yesterday. Previous A1C(s) -   Lab Results   Component Value Date/Time    Hemoglobin A1c 5.9 (H) 10/24/2019 10:23 AM    Hemoglobin A1c 6.1 (H) 08/06/2018 10:00 AM    Hemoglobin A1c 6.1 (H) 08/03/2016 11:00 AM     Previous diabetes or pre diabetes education - none. Medical History -    Patient Active Problem List   Diagnosis Code    Obesity E66.9    Hyperglycemia R73.9    Skin lesion L98.9    Severe obesity (BMI 35.0-39. 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 (e.g. following a diet, medication, stress) -n/a    AADE 7 Self-Care Behaviors-    1) Healthy Eating/Food Recall- eats 2-3 meals daily; cooks during the week; does not eat pork; is a finicky eater; foods with color-only likes green beans and green, grapes and apples  BK -10-11 am-skipped this morning; Honey bunches of oats dry cereal-~2 cups, oj-2 cups  LN - 12:00- ckick jamie a sandwich-fried chicken, medium regular lemonade  DN - 6:00 pm- blackeyes peas - 2 cups, water  SN - popcorn-mid afternoon mostly  JUAN PABLO - oj, lemonade, water, regular gatorade, no alcohol    2) Being Active- has an exercise program in spurts; used to take [employer]; lifts wts, jumps rope, or push ups-at work, treadmill-1 mile (20 minutes after dinner); also has a rower    3) Self Monitoring Blood Glucose (SMBG)- not ordered    How to treat a low blood sugar -  n/a    4) Taking Medication-   Key Antihyperglycemic Medications     Patient is on no antihyperglycemic meds. Understanding - n/a    5) Problem Solving- pt is ready and willing to manage his high blood sugars by making adjustments to his treatment plan    6) Reducing Risk-   Tobacco - does not smoke  Hypertension - n/a  Hyperlipidemia - pending labs  Antiplatelet agent- pt will discuss with ask Dr Sang De Leon    Current Outpatient Medications on File Prior to Visit   Medication Sig Dispense Refill    sildenafil citrate (VIAGRA) 100 mg tablet Take 1 Tab by mouth as needed. 6 Tab 11     No current facility-administered medications on file prior to visit. Discussed possible complications of uncontrolled diabetes ie fatigue, dehydration, damage to vital organs. 7) Healthy Coping-   Support system - Pt's wife is supportive in helping pt attain and maintain his diabetes health. Pt will take advantage of the support and education provided today and of the support of his health care team.    Barriers identified - none    Health Maintenance Due:  Health Maintenance Due   Topic Date Due    DTaP/Tdap/Td series (1 - Tdap) 04/02/1990    Shingrix Vaccine Age 50> (1 of 2) 04/02/2019    Colorectal Cancer Screening Combo  04/02/2019    Flu Vaccine (1) 09/01/2020     Advance Care Planning - not on file; education provided    Resources provided:  preDM handout, placemat, mindful eating, label, surviving holidays, apps, low fat  -Diabetes Made Simple Video    -Plan Your Portions (diabetes. org/whatcanieat)    -Label Reading Basics for Diabetes    -Carb Counting and Meal Planning    -Learning About Low-Fat Eating    -Low and No-Carb Snack Ideas for Diabetics    -Prediabetes: What is it and what can I do?    -The Importance of Mindful Eating    -List of apps designed to support weight loss and weight maintenance efforts    -Surviving the [place] - 10 Steps to Success    DSMT Preference- n/a    Summary of top problems for patient -     1. Lack of understanding of what is a carb    Community Services, Referrals, and Durable Medical Equipment - n/a    Plan / Pt Goals -   -lose weight by making some lifestyle modifications  -add more protein, veggies and fruit and balance meals  -eliminate juices and lemonade  -strive to start following the healthy plate meal plan being mindful of what is a carb (fruit, dairy, grains, starchy vegetables) and portion size. As a rule, 1/2 cup of a carb food is a serving size (15 grams). The guideline is up to 45-60 grams of carb per meal (3 meals per day) or 3-4 servings per meal.  A snack guideline is- 1 oz. protein serving and may add 1 carb serving (15 grams)  -start reading nutrition labels  -continue exercising at home and at work, keeping in mind the goal is 150 minutes (2 & 1/2 hours) of moderate exercise weekly like a brisk walk  -follow up with Dr Anna Rodriguez next April  -call [address] at [phone] with any questions      Future Appointments   Date Time Provider [place]al [place]   10/22/2021  8:30 AM Dinorah Carrillo MD [facility] BS AMB      Last Appointment My Department:  10/23/2020    Chart was routed to Dr Anna Rodriguez.     Olga Cochran RN, BSN, [address] [place] [address]  (Office) [phone]  (Cell) [phone]

## 2020-11-21 DIAGNOSIS — N17.9 AKI (ACUTE KIDNEY INJURY) (HCC): Primary | ICD-10-CM

## 2020-11-21 LAB
25(OH)D3+25(OH)D2 SERPL-MCNC: 16.9 NG/ML (ref 30–100)
ALBUMIN SERPL-MCNC: 4.6 G/DL (ref 3.8–4.9)
ALBUMIN/GLOB SERPL: 1.6 {RATIO} (ref 1.2–2.2)
ALP SERPL-CCNC: 87 IU/L (ref 39–117)
ALT SERPL-CCNC: 21 IU/L (ref 0–44)
AST SERPL-CCNC: 25 IU/L (ref 0–40)
BILIRUB SERPL-MCNC: 0.6 MG/DL (ref 0–1.2)
BUN SERPL-MCNC: 9 MG/DL (ref 6–24)
BUN/CREAT SERPL: 6 (ref 9–20)
CALCIUM SERPL-MCNC: 9.6 MG/DL (ref 8.7–10.2)
CHLORIDE SERPL-SCNC: 100 MMOL/L (ref 96–106)
CHOLEST SERPL-MCNC: 183 MG/DL (ref 100–199)
CO2 SERPL-SCNC: 24 MMOL/L (ref 20–29)
CREAT SERPL-MCNC: 1.56 MG/DL (ref 0.76–1.27)
ERYTHROCYTE [DISTWIDTH] IN BLOOD BY AUTOMATED COUNT: 12.8 % (ref 11.6–15.4)
EST. AVERAGE GLUCOSE BLD GHB EST-MCNC: 120 MG/DL
GLOBULIN SER CALC-MCNC: 2.8 G/DL (ref 1.5–4.5)
GLUCOSE SERPL-MCNC: 106 MG/DL (ref 65–99)
HBA1C MFR BLD: 5.8 % (ref 4.8–5.6)
HCT VFR BLD AUTO: 47.2 % (ref 37.5–51)
HDLC SERPL-MCNC: 55 MG/DL
HGB BLD-MCNC: 16.6 G/DL (ref 13–17.7)
LDLC SERPL CALC-MCNC: 107 MG/DL (ref 0–99)
MCH RBC QN AUTO: 32.4 PG (ref 26.6–33)
MCHC RBC AUTO-ENTMCNC: 35.2 G/DL (ref 31.5–35.7)
MCV RBC AUTO: 92 FL (ref 79–97)
PLATELET # BLD AUTO: 316 X10E3/UL (ref 150–450)
POTASSIUM SERPL-SCNC: 4.2 MMOL/L (ref 3.5–5.2)
PROT SERPL-MCNC: 7.4 G/DL (ref 6–8.5)
RBC # BLD AUTO: 5.12 X10E6/UL (ref 4.14–5.8)
SODIUM SERPL-SCNC: 140 MMOL/L (ref 134–144)
TRIGL SERPL-MCNC: 115 MG/DL (ref 0–149)
TSH SERPL DL<=0.005 MIU/L-ACNC: 1.21 UIU/ML (ref 0.45–4.5)
VLDLC SERPL CALC-MCNC: 21 MG/DL (ref 5–40)
WBC # BLD AUTO: 7.7 X10E3/UL (ref 3.4–10.8)

## 2020-11-21 NOTE — PROGRESS NOTES
Tell pt normal cbc lytes liver lipds and thyroid  Stable prediabetes--low calorie diet recommended  Vit d is low --take otc vit d 1000 iu every day  Kidney function is slighly impaired--advised to hydrate, avoid nsiads--repeat bmp in 1-2 weeks--ordered

## 2020-11-23 NOTE — PROGRESS NOTES
Spoke with patient using 2 identifiers. Patient was informed and advised Per   Dr. Thomas Scott recommendations. Patient verbalized understanding.

## 2020-12-08 LAB
BUN SERPL-MCNC: 12 MG/DL (ref 6–24)
BUN/CREAT SERPL: 8 (ref 9–20)
CALCIUM SERPL-MCNC: 9.8 MG/DL (ref 8.7–10.2)
CHLORIDE SERPL-SCNC: 99 MMOL/L (ref 96–106)
CO2 SERPL-SCNC: 24 MMOL/L (ref 20–29)
CREAT SERPL-MCNC: 1.47 MG/DL (ref 0.76–1.27)
GLUCOSE SERPL-MCNC: 90 MG/DL (ref 65–99)
POTASSIUM SERPL-SCNC: 4.4 MMOL/L (ref 3.5–5.2)
SODIUM SERPL-SCNC: 139 MMOL/L (ref 134–144)

## 2021-01-18 ENCOUNTER — PATIENT OUTREACH (OUTPATIENT)
Dept: INTERNAL MEDICINE CLINIC | Age: 51
End: 2021-01-18

## 2021-01-18 NOTE — PROGRESS NOTES
Goals      Patient verbalizes understanding of self -management goals of living with Pre-Diabetes. 1/18/21-dkw  -left message inquiring how pt is doing and to call Luma Rogers at [phone] to discuss goals (lose wt-cut out juices/lemonade; add protein, fruit and veggie) or ask questions  -predsme initiated 11/20/20 for a1c of 5.9% on 10/24/19  Future Appointments   Date Time Provider [place]   10/22/2021  8:30 AM Rianna Crenshaw MD [facility] BS AMB   Last Appointment My Department:  10/23/2020  -plan to follow up in 1-2 months    11/20/20-dkw  -dsme for prediabetes was initiated on 11/20/20  -last a1c was 5.9% on 10/24/19; pt stated he had his labs done yesterday  -lose weight by making some lifestyle modifications  -add more protein, non-starchy veggies and fruit and balance meals  -eliminate juices and lemonade  -strive to start following the healthy plate meal plan being mindful of what is a carb (fruit, dairy, grains, starchy vegetables) and portion size. As a rule, 1/2 cup of a carb food is a serving size (15 grams).  The guideline is up to 45-60 grams of carb per meal (3 meals per day) or 3-4 servings per meal.  A snack guideline is- 1 oz. protein serving and may add 1 carb serving (15 grams)  -start reading nutrition labels  -continue exercising at home and at work, keeping in mind the goal is 150 minutes (2 & 1/2 hours) of moderate exercise weekly like a brisk walk  -follow up with Dr Mei Law next April  -call Luma Rogers at [phone] with any questions  -will follow is ~ 6 weeks

## 2021-01-19 ENCOUNTER — PATIENT OUTREACH (OUTPATIENT)
Dept: INTERNAL MEDICINE CLINIC | Age: 51
End: 2021-01-19

## 2021-01-19 NOTE — PROGRESS NOTES
Patient has graduated from the Complex Case Management  program on 1/19/21 for prediabetes. Patient/family has the ability to self-manage at this time Care management goals have been completed. No further Ambulatory Care Manager follow up scheduled. -a1c was 5.8% on 11/21/20, down from 5.9% on 10/24/19    Goals Addressed                 This Visit's Progress     COMPLETED: Patient verbalizes understanding of self -management goals of living with Pre-Diabetes. On track     1/19/21-dkw  -pt called back stating he thinks he has lost some wt, but has not checked on purpose-waiting for next appt  -pt has cut out juices and lemonade, is decreasing food portions, and is eating healthier-veggies, fruit, adding protein  -pt is exercising on stationary bike in the AM, doing fat burning exercise in the afternoon and walking on the treadmill in the evening  -pt agreed is on track; will close CCM at this time  -next appt with Dr Eileen Benson in October 2021 1/18/21-dkw  -left message inquiring how pt is doing and to call Vijaya Contreras at [phone] to discuss goals (lose wt-cut out juices/lemonade; add protein, fruit and veggie) or ask questions  -predsme initiated 11/20/20 for a1c of 5.9% on 10/24/19  Future Appointments   Date Time Provider Coral Villa   10/22/2021  8:30 AM Ye Cuellar MD [facility] BS AMB   Last Appointment My Department:  10/23/2020  -plan to follow up in 1-2 months    11/20/20-dkw  -dsme for prediabetes was initiated on 11/20/20  -last a1c was 5.9% on 10/24/19; pt stated he had his labs done yesterday  -lose weight by making some lifestyle modifications  -add more protein, non-starchy veggies and fruit and balance meals  -eliminate juices and lemonade  -strive to start following the healthy plate meal plan being mindful of what is a carb (fruit, dairy, grains, starchy vegetables) and portion size. As a rule, 1/2 cup of a carb food is a serving size (15 grams).  The guideline is up to 45-60 grams of carb per meal (3 meals per day) or 3-4 servings per meal.  A snack guideline is- 1 oz. protein serving and may add 1 carb serving (15 grams)  -start reading nutrition labels  -continue exercising at home and at work, keeping in mind the goal is 150 minutes (2 & 1/2 hours) of moderate exercise weekly like a brisk walk  -follow up with Dr Yanna Quinones next April  -call Fausto Davis at [phone] with any questions  -will follow is ~ 6 weeks            Patient has [address] contact information for any further questions, concerns, or needs.   Patients upcoming visits:    Future Appointments   Date Time Provider Coral Villa   10/22/2021  8:30 AM Unknown MD [facility] BS AMB

## 2021-10-15 ENCOUNTER — TELEPHONE (OUTPATIENT)
Dept: INTERNAL MEDICINE CLINIC | Age: 51
End: 2021-10-15

## 2021-10-15 NOTE — TELEPHONE ENCOUNTER
Called patient. ID verified with Name and . Spoke with patient in regards to message received. Informed patient that he could request to have labs prior to appt or he could wait until the date of his appt, as it is an 8:30 am appt. Informed patient, if he chose to wait, be sure that he is fasting at the time of his appt. Patient states that he would wait until appt date. Patient states that he understands the information received and has no further questions or concerns at this time.

## 2021-10-22 ENCOUNTER — OFFICE VISIT (OUTPATIENT)
Dept: INTERNAL MEDICINE CLINIC | Age: 51
End: 2021-10-22
Payer: COMMERCIAL

## 2021-10-22 VITALS
BODY MASS INDEX: 37.51 KG/M2 | HEIGHT: 67 IN | TEMPERATURE: 97.5 F | WEIGHT: 239 LBS | DIASTOLIC BLOOD PRESSURE: 80 MMHG | RESPIRATION RATE: 16 BRPM | HEART RATE: 79 BPM | SYSTOLIC BLOOD PRESSURE: 110 MMHG | OXYGEN SATURATION: 97 %

## 2021-10-22 DIAGNOSIS — E66.9 OBESITY (BMI 30-39.9): ICD-10-CM

## 2021-10-22 DIAGNOSIS — R73.03 PREDIABETES: ICD-10-CM

## 2021-10-22 DIAGNOSIS — E66.01 SEVERE OBESITY (BMI 35.0-35.9 WITH COMORBIDITY) (HCC): ICD-10-CM

## 2021-10-22 DIAGNOSIS — Z00.00 ROUTINE GENERAL MEDICAL EXAMINATION AT A HEALTH CARE FACILITY: Primary | ICD-10-CM

## 2021-10-22 LAB
25(OH)D3 SERPL-MCNC: 23.9 NG/ML (ref 30–100)
ALBUMIN SERPL-MCNC: 4.1 G/DL (ref 3.5–5)
ALBUMIN/GLOB SERPL: 1.2 {RATIO} (ref 1.1–2.2)
ALP SERPL-CCNC: 90 U/L (ref 45–117)
ALT SERPL-CCNC: 36 U/L (ref 12–78)
ANION GAP SERPL CALC-SCNC: 6 MMOL/L (ref 5–15)
AST SERPL-CCNC: 39 U/L (ref 15–37)
BILIRUB SERPL-MCNC: 0.9 MG/DL (ref 0.2–1)
BUN SERPL-MCNC: 12 MG/DL (ref 6–20)
BUN/CREAT SERPL: 8 (ref 12–20)
CALCIUM SERPL-MCNC: 9.2 MG/DL (ref 8.5–10.1)
CHLORIDE SERPL-SCNC: 104 MMOL/L (ref 97–108)
CHOLEST SERPL-MCNC: 169 MG/DL
CO2 SERPL-SCNC: 27 MMOL/L (ref 21–32)
CREAT SERPL-MCNC: 1.42 MG/DL (ref 0.7–1.3)
ERYTHROCYTE [DISTWIDTH] IN BLOOD BY AUTOMATED COUNT: 13.2 % (ref 11.5–14.5)
EST. AVERAGE GLUCOSE BLD GHB EST-MCNC: 120 MG/DL
GLOBULIN SER CALC-MCNC: 3.3 G/DL (ref 2–4)
GLUCOSE SERPL-MCNC: 91 MG/DL (ref 65–100)
HBA1C MFR BLD: 5.8 % (ref 4–5.6)
HCT VFR BLD AUTO: 50.2 % (ref 36.6–50.3)
HCV AB SERPL QL IA: NONREACTIVE
HDLC SERPL-MCNC: 57 MG/DL
HDLC SERPL: 3 {RATIO} (ref 0–5)
HGB BLD-MCNC: 16.2 G/DL (ref 12.1–17)
LDLC SERPL CALC-MCNC: 86.2 MG/DL (ref 0–100)
MCH RBC QN AUTO: 31.8 PG (ref 26–34)
MCHC RBC AUTO-ENTMCNC: 32.3 G/DL (ref 30–36.5)
MCV RBC AUTO: 98.4 FL (ref 80–99)
NRBC # BLD: 0 K/UL (ref 0–0.01)
NRBC BLD-RTO: 0 PER 100 WBC
PLATELET # BLD AUTO: 323 K/UL (ref 150–400)
PMV BLD AUTO: 10.6 FL (ref 8.9–12.9)
POTASSIUM SERPL-SCNC: 4.1 MMOL/L (ref 3.5–5.1)
PROT SERPL-MCNC: 7.4 G/DL (ref 6.4–8.2)
RBC # BLD AUTO: 5.1 M/UL (ref 4.1–5.7)
SODIUM SERPL-SCNC: 137 MMOL/L (ref 136–145)
TRIGL SERPL-MCNC: 129 MG/DL (ref ?–150)
TSH SERPL DL<=0.05 MIU/L-ACNC: 0.82 UIU/ML (ref 0.36–3.74)
VLDLC SERPL CALC-MCNC: 25.8 MG/DL
WBC # BLD AUTO: 8.2 K/UL (ref 4.1–11.1)

## 2021-10-22 PROCEDURE — 99396 PREV VISIT EST AGE 40-64: CPT | Performed by: INTERNAL MEDICINE

## 2021-10-22 NOTE — PATIENT INSTRUCTIONS
Office Policies    Phone calls/patient messages:            Please allow up to 24 hours for someone in the office to contact you about your call or message. Be mindful your provider may be out of the office or your message may require further review. We encourage you to use [employer] for your messages as this is a faster, more efficient way to communicate with our office                         Medication Refills:            Prescription medications require 48-72 business hours to process. We encourage you to use [employer] for your refills. For controlled medications: Please allow 72 business hours to process. Certain medications may require you to  a written prescription at our office. NO narcotic/controlled medications will be prescribed after 4pm Monday through Friday or on weekends              Form/Paperwork Completion:            Please note a $25 fee may incur for all paperwork for completed by our providers. We ask that you allow 7-10 business days. Pre-payment is due prior to picking up/faxing the completed form. You may also download your forms to [employer] to have your doctor print off.

## 2021-10-22 NOTE — PROGRESS NOTES
HISTORY OF PRESENT ILLNESS  Kendall Bonilla is a 45 y.o. male. HPI     Fu prediabetes, vit d  Deficiency ,janet not on cpap and obesity and CPE  Saw  URO MD--PSA last down to 0.7 last year and stbale thus year per pt  No BPH symptoms  Exercises daily  Feels overall  Sleeps ok without cpap  Some b/l lateral knee pain  Nonsmoker, no etoh   with grown kids    Last OV  Did not get labs earlier this year  Weight--236 lb  Left leg lateral hip area sometimes experiences sharp painand numbness  No back pain  Saw Neurologist last year -had MRI and EMG--neg vasquez. Weight loss recommended  Saw urologist this year--PSA down to 0.7  Had colonoscopy last month--no polyps per ptDr Liyah Nearing    Patient Active Problem List    Diagnosis Date Noted    ED (erectile dysfunction) 10/23/2019    Elevated PSA 10/23/2019    JANET (obstructive sleep apnea) 10/23/2019    Severe obesity (BMI 35.0-39.9) 09/12/2018    Skin lesion 03/19/2018    Hyperglycemia 06/04/2014    Obesity 09/24/2013     Current Outpatient Medications   Medication Sig Dispense Refill    sildenafil citrate (VIAGRA) 100 mg tablet Take 1 Tab by mouth as needed.  6 Tab 11     No Known Allergies  Social History     Tobacco Use    Smoking status: Never Smoker    Smokeless tobacco: Never Used   Substance Use Topics    Alcohol use: No      Lab Results   Component Value Date/Time    WBC 7.7 11/20/2020 09:38 AM    HGB 16.6 11/20/2020 09:38 AM    HCT 47.2 11/20/2020 09:38 AM    PLATELET 073 61/76/8825 09:38 AM    MCV 92 11/20/2020 09:38 AM     Lab Results   Component Value Date/Time    Hemoglobin A1c 5.8 (H) 11/20/2020 09:38 AM    Hemoglobin A1c 5.9 (H) 10/24/2019 10:23 AM    Hemoglobin A1c 6.1 (H) 08/06/2018 10:00 AM    Glucose 90 12/07/2020 11:18 AM    LDL, calculated 107 (H) 11/20/2020 09:38 AM    LDL, calculated 121 (H) 10/24/2019 10:23 AM    Creatinine 1.47 (H) 12/07/2020 11:18 AM      Lab Results   Component Value Date/Time    Cholesterol, total 183 11/20/2020 09:38 AM HDL Cholesterol 55 11/20/2020 09:38 AM    LDL, calculated 107 (H) 11/20/2020 09:38 AM    LDL, calculated 121 (H) 10/24/2019 10:23 AM    Triglyceride 115 11/20/2020 09:38 AM     Lab Results   Component Value Date/Time    GFR est non-AA 54 (L) 12/07/2020 11:18 AM    GFR est AA 63 12/07/2020 11:18 AM    Creatinine 1.47 (H) 12/07/2020 11:18 AM    BUN 12 12/07/2020 11:18 AM    Sodium 139 12/07/2020 11:18 AM    Potassium 4.4 12/07/2020 11:18 AM    Chloride 99 12/07/2020 11:18 AM    CO2 24 12/07/2020 11:18 AM     Lab Results   Component Value Date/Time    Prostate Specific Ag 13.7 (H) 08/06/2018 10:00 AM     Lab Results   Component Value Date/Time    TSH 1.210 11/20/2020 09:38 AM      Lab Results   Component Value Date/Time    Glucose 90 12/07/2020 11:18 AM         Review of Systems   Constitutional: Negative for chills, fever, malaise/fatigue and weight loss. HENT: Negative. Eyes: Negative for blurred vision and double vision. Respiratory: Negative for cough and shortness of breath. Cardiovascular: Negative for chest pain and palpitations. Gastrointestinal: Negative for abdominal pain, blood in stool, constipation, diarrhea, melena, nausea and vomiting. Genitourinary: Negative for dysuria, frequency, hematuria and urgency. Musculoskeletal: Negative for back pain, falls, joint pain and myalgias. Skin: Negative. Neurological: Negative for dizziness, tremors and headaches. Psychiatric/Behavioral: Negative. Physical Exam  Vitals and nursing note reviewed. Constitutional:       General: He is not in acute distress. Appearance: Normal appearance. He is well-developed. He is obese. Comments: Appears stated age   HENT:      Head: Normocephalic. Right Ear: Tympanic membrane normal.      Left Ear: Tympanic membrane normal.      Nose: Nose normal.      Mouth/Throat:      Mouth: Mucous membranes are moist.   Eyes:      Pupils: Pupils are equal, round, and reactive to light. Cardiovascular:      Rate and Rhythm: Normal rate and regular rhythm. Heart sounds: Normal heart sounds. Pulmonary:      Effort: Pulmonary effort is normal.      Breath sounds: Normal breath sounds. Abdominal:      Palpations: Abdomen is soft. Musculoskeletal:         General: Normal range of motion. Cervical back: Normal range of motion. Skin:     General: Skin is warm. Neurological:      General: No focal deficit present. Mental Status: He is alert. Psychiatric:         Mood and Affect: Mood normal.         Thought Content: Thought content normal.         Judgment: Judgment normal.         ASSESSMENT and PLAN  Diagnoses and all orders for this visit:    1. Routine general medical examination at a health care facility  -     CBC W/O DIFF; Future  -     METABOLIC PANEL, COMPREHENSIVE; Future  -     LIPID PANEL; Future  -     TSH 3RD GENERATION; Future  -     HEPATITIS C AB; Future  -     VITAMIN D, 25 HYDROXY; Future   Pt declines vaccinations   UTD colonoscopy-will request  2. Prediabetes  -     HEMOGLOBIN A1C WITH EAG; Future    3. Obesity (BMI 30-39. 9)   I have reviewed/discussed the above normal BMI with the patient. I have recommended the following interventions: dietary management education, guidance, and counseling and encourage exercise . Liliam Hunt 4. Severe obesity (BMI 35.0-35.9 with comorbidity) ([facility]      Follow-up and Dispositions    · Return in about 1 year (around 10/22/2022) for CPE.

## 2022-03-18 PROBLEM — R97.20 ELEVATED PSA: Status: ACTIVE | Noted: 2019-10-23

## 2022-03-19 PROBLEM — G47.33 OSA (OBSTRUCTIVE SLEEP APNEA): Status: ACTIVE | Noted: 2019-10-23

## 2022-03-19 PROBLEM — N52.9 ED (ERECTILE DYSFUNCTION): Status: ACTIVE | Noted: 2019-10-23

## 2022-03-19 PROBLEM — L98.9 SKIN LESION: Status: ACTIVE | Noted: 2018-03-19

## 2022-10-22 NOTE — PROGRESS NOTES
HISTORY OF PRESENT ILLNESS  Mehul Young is a 47 y.o. male. HPI  Fu prediabetes, vit d  Deficiency ,amari not on cpap and obesity and CPE  Sees Dr Pete Vilchis yearly for hx elevated PSA, bph and ED --PSa was done last month  On vit d 5000 iu qd  Not using CPAP-sleeping ok  C/o moles scalp and left arm 1 year -scalp lesion getting larger  Exercise waks and some jogging  He notes enlarging dark lesion on scalp over the  past year and similar lesion on left triceps  Nonsmoker  No eoth  Marrid grown chidren   [place] [facility]    Last OV  Saw  URO MD--PSA last down to 0.7 last year and stbale thus year per pt  No BPH symptoms  Exercises daily  Feels overall  Sleeps ok without cpap  Some b/l lateral knee pain  Nonsmoker, no etoh   with grown kids       Patient Active Problem List    Diagnosis Date Noted    ED (erectile dysfunction) 10/23/2019    Elevated PSA 10/23/2019    AMARI (obstructive sleep apnea) 10/23/2019    Severe obesity (BMI 35.0-39.9) 09/12/2018    Skin lesion 03/19/2018    Hyperglycemia 06/04/2014    Obesity 09/24/2013     Current Outpatient Medications   Medication Sig Dispense Refill    sildenafil citrate (VIAGRA) 100 mg tablet Take 1 Tab by mouth as needed.  (Patient not taking: Reported on 10/22/2021) 6 Tab 11     No Known Allergies  Social History     Tobacco Use    Smoking status: Never    Smokeless tobacco: Never   Substance Use Topics    Alcohol use: No      Lab Results   Component Value Date/Time    WBC 8.2 10/22/2021 08:55 AM    HGB 16.2 10/22/2021 08:55 AM    HCT 50.2 10/22/2021 08:55 AM    PLATELET 718 07/58/1083 08:55 AM    MCV 98.4 10/22/2021 08:55 AM     Lab Results   Component Value Date/Time    Hemoglobin A1c 5.8 (H) 10/22/2021 08:55 AM    Hemoglobin A1c 5.8 (H) 11/20/2020 09:38 AM    Hemoglobin A1c 5.9 (H) 10/24/2019 10:23 AM    Glucose 91 10/22/2021 08:55 AM    LDL, calculated 86.2 10/22/2021 08:55 AM    Creatinine 1.42 (H) 10/22/2021 08:55 AM      Lab Results   Component Value Date/Time    Cholesterol, total 169 10/22/2021 08:55 AM    HDL Cholesterol 57 10/22/2021 08:55 AM    LDL, calculated 86.2 10/22/2021 08:55 AM    Triglyceride 129 10/22/2021 08:55 AM    CHOL/HDL Ratio 3.0 10/22/2021 08:55 AM     Lab Results   Component Value Date/Time    GFR est non-AA 52 (L) 10/22/2021 08:55 AM    GFR est AA >60 10/22/2021 08:55 AM    Creatinine 1.42 (H) 10/22/2021 08:55 AM    BUN 12 10/22/2021 08:55 AM    Sodium 137 10/22/2021 08:55 AM    Potassium 4.1 10/22/2021 08:55 AM    Chloride 104 10/22/2021 08:55 AM    CO2 27 10/22/2021 08:55 AM        Review of Systems   Constitutional:  Negative for chills, fever, malaise/fatigue and weight loss. HENT: Negative. Eyes:  Negative for blurred vision and double vision. Respiratory:  Negative for cough and shortness of breath. Cardiovascular:  Negative for chest pain and palpitations. Gastrointestinal:  Negative for abdominal pain, blood in stool, constipation, diarrhea, melena, nausea and vomiting. Genitourinary:  Negative for dysuria, frequency, hematuria and urgency. Musculoskeletal:  Negative for back pain, falls, joint pain and myalgias. Skin: Negative. Neurological:  Negative for dizziness, tremors and headaches. Physical Exam  Vitals and nursing note reviewed. Constitutional:       General: He is not in acute distress. Appearance: Normal appearance. He is well-developed. Comments: Appears stated age   HENT:      Head: Normocephalic. Right Ear: Tympanic membrane normal.      Left Ear: Tympanic membrane normal.      Mouth/Throat:      Mouth: Mucous membranes are moist.   Eyes:      Pupils: Pupils are equal, round, and reactive to light. Cardiovascular:      Rate and Rhythm: Normal rate and regular rhythm. Heart sounds: Normal heart sounds. Pulmonary:      Effort: Pulmonary effort is normal.      Breath sounds: Normal breath sounds. Abdominal:      Palpations: Abdomen is soft.    Musculoskeletal: General: Normal range of motion. Cervical back: Normal range of motion. Right lower leg: No edema. Left lower leg: No edema. Skin:         Neurological:      General: No focal deficit present. Mental Status: He is alert. Psychiatric:         Mood and Affect: Mood normal.         Behavior: Behavior normal.         Thought Content: Thought content normal.         Judgment: Judgment normal.       ASSESSMENT and PLAN    ICD-10-CM ICD-9-CM    1. Prediabetes  R73.03 790.29 HEMOGLOBIN A1C WITH EAG      2. Vitamin D deficiency  E55.9 268.9 VITAMIN D, 25 HYDROXY      3. Routine general medical examination at a health care facility  Z00.00 V70.0 CBC W/O DIFF      METABOLIC PANEL, COMPREHENSIVE      LIPID PANEL      TSH 3RD GENERATION      URINALYSIS W/ REFLEX CULTURE  Recommended flu shot , shingrix, tdap and covid boosters  Continue healthy lifestyle but weight reduction needed--d/w pt      4. Obesity (BMI 30-39. 9)  E66.9 278.00             5. Skin lesions  L98.9 709.9 Raised lesions--appear benign but refer to [address]      6. Severe obesity (BMI 35.0-39. 9) with comorbidity ([address]  E66.01 278.01 Discussed diet and exercise   7.       BPH-asymptomatic--fu URO MD    RTC 1 year CPE

## 2022-10-24 ENCOUNTER — OFFICE VISIT (OUTPATIENT)
Dept: INTERNAL MEDICINE CLINIC | Age: 52
End: 2022-10-24
Payer: COMMERCIAL

## 2022-10-24 VITALS
RESPIRATION RATE: 16 BRPM | BODY MASS INDEX: 35.83 KG/M2 | SYSTOLIC BLOOD PRESSURE: 101 MMHG | HEART RATE: 78 BPM | TEMPERATURE: 97.2 F | DIASTOLIC BLOOD PRESSURE: 64 MMHG | OXYGEN SATURATION: 99 % | HEIGHT: 68 IN | WEIGHT: 236.4 LBS

## 2022-10-24 DIAGNOSIS — E66.9 OBESITY (BMI 30-39.9): ICD-10-CM

## 2022-10-24 DIAGNOSIS — L98.9 SKIN LESIONS: ICD-10-CM

## 2022-10-24 DIAGNOSIS — E55.9 VITAMIN D DEFICIENCY: ICD-10-CM

## 2022-10-24 DIAGNOSIS — E66.01 SEVERE OBESITY (BMI 35.0-39.9) WITH COMORBIDITY (HCC): ICD-10-CM

## 2022-10-24 DIAGNOSIS — Z00.00 ROUTINE GENERAL MEDICAL EXAMINATION AT A HEALTH CARE FACILITY: ICD-10-CM

## 2022-10-24 DIAGNOSIS — R73.03 PREDIABETES: Primary | ICD-10-CM

## 2022-10-24 PROCEDURE — 99396 PREV VISIT EST AGE 40-64: CPT | Performed by: INTERNAL MEDICINE

## 2022-10-24 RX ORDER — AZITHROMYCIN 250 MG/1
250 TABLET, FILM COATED ORAL SEE ADMIN INSTRUCTIONS
Qty: 6 TABLET | Refills: 0 | Status: SHIPPED | OUTPATIENT
Start: 2022-10-24 | End: 2022-10-24 | Stop reason: CLARIF

## 2022-10-24 NOTE — PROGRESS NOTES
HISTORY OF PRESENT ILLNESS  47 y.o. male, here for CPE and Fu prediabetes, vit d  Deficiency ,janet not on cpap and obesity    Exercises a couple times a week (treadmill, martial arts), Feels overall, Sleeps ok. Nonsmoker, no etoh,  with grown kids, work at [employer], safe at work and home. Left leg lateral hip area sometimes experiences sharp painand numbness, No back pain  Saw Neurologist 2-3 years ago -had MRI left leg due to nerve pain and EMG--neg vasquez. Weight loss recommended  Saw urologist last month--awaiting results () No BPH symptoms  Had colonoscopy 2 years ago--no polyps per pt/Dr Ramachinmay Howard   Does have 2 moles (right top scalp and left upper bicep area)    Review of Systems   Constitutional: Negative for chills, fever, malaise/fatigue and weight loss. HENT: Negative. Eyes: Negative for blurred vision and double vision. Respiratory: Negative for cough and shortness of breath. Cardiovascular: Negative for chest pain and palpitations. Gastrointestinal: Negative for abdominal pain, blood in stool, constipation, diarrhea, melena, nausea and vomiting. Genitourinary: Negative for dysuria, frequency, hematuria and urgency. Musculoskeletal: Negative for back pain, falls, joint pain and myalgias. Skin: 2 moles top right scalp and left bicep  Neurological: Negative for dizziness, tremors and headaches. Psychiatric/Behavioral: Negative. Physical Exam  Vitals and nursing note reviewed. Constitutional:       General: He is not in acute distress. Appearance: Normal appearance. He is well-developed. He is obese. Comments: Appears stated age   HENT:      Head: Normocephalic. Right Ear: Tympanic membrane normal.      Left Ear: Tympanic membrane normal.      Nose: Nose normal.      Mouth/Throat:      Mouth: Mucous membranes are moist.   Eyes:      Pupils: Pupils are equal, round, and reactive to light.    Cardiovascular:      Rate and Rhythm: Normal rate and regular rhythm. Heart sounds: Normal heart sounds. Pulmonary:      Effort: Pulmonary effort is normal.      Breath sounds: Normal breath sounds. Abdominal:      Palpations: Abdomen is soft. Musculoskeletal:         General: Normal range of motion. Cervical back: Normal range of motion. Skin:     General: Skin is warm. 2 moles top right scalp and left bicep  Neurological:      General: No focal deficit present. Mental Status: He is alert. Psychiatric:         Mood and Affect: Mood normal.         Thought Content: Thought content normal.         Judgment: Judgment normal.          ASSESSMENT and PLAN     1. Routine general medical examination at a health care facility  CBC  CMP  Lipid panel  TSH  PSA  Vit D  Hep C, consider  Pt declines vaccinations, will consider Shingrex in future    2. Prediabetes  AIC     3. Obesity   Reviewed/discussed the above normal BMI with the patient. I have recommended the following interventions: dietary management education, guidance, and counseling and encourage exercise    4. Skin       Refer Derm consult        Follow-up and Dispositions    Return in about 1 year (around 10/24/2023) for CPE.

## 2022-10-24 NOTE — PROGRESS NOTES
1. \"Have you been to the ER, urgent care clinic since your last visit? Hospitalized since your last visit? \" No    2. \"Have you seen or consulted any other health care providers outside of the [address] [place] [address] since your last visit? \" No     3. For patients aged 39-70: Has the patient had a colonoscopy / FIT/ Cologuard? Yes - Care Gap present.  Most recent result on file      I

## 2022-10-25 LAB
25(OH)D3 SERPL-MCNC: 24.5 NG/ML (ref 30–100)
ALBUMIN SERPL-MCNC: 4.4 G/DL (ref 3.5–5)
ALBUMIN/GLOB SERPL: 1.2 {RATIO} (ref 1.1–2.2)
ALP SERPL-CCNC: 79 U/L (ref 45–117)
ALT SERPL-CCNC: 34 U/L (ref 12–78)
ANION GAP SERPL CALC-SCNC: 4 MMOL/L (ref 5–15)
APPEARANCE UR: ABNORMAL
AST SERPL-CCNC: 32 U/L (ref 15–37)
BACTERIA URNS QL MICRO: NEGATIVE /HPF
BILIRUB SERPL-MCNC: 0.7 MG/DL (ref 0.2–1)
BILIRUB UR QL: NEGATIVE
BUN SERPL-MCNC: 13 MG/DL (ref 6–20)
BUN/CREAT SERPL: 9 (ref 12–20)
CALCIUM SERPL-MCNC: 9.4 MG/DL (ref 8.5–10.1)
CHLORIDE SERPL-SCNC: 105 MMOL/L (ref 97–108)
CHOLEST SERPL-MCNC: 218 MG/DL
CO2 SERPL-SCNC: 29 MMOL/L (ref 21–32)
COLOR UR: ABNORMAL
CREAT SERPL-MCNC: 1.37 MG/DL (ref 0.7–1.3)
EPITH CASTS URNS QL MICRO: ABNORMAL /LPF
ERYTHROCYTE [DISTWIDTH] IN BLOOD BY AUTOMATED COUNT: 13.4 % (ref 11.5–14.5)
EST. AVERAGE GLUCOSE BLD GHB EST-MCNC: 117 MG/DL
GLOBULIN SER CALC-MCNC: 3.6 G/DL (ref 2–4)
GLUCOSE SERPL-MCNC: 105 MG/DL (ref 65–100)
GLUCOSE UR STRIP.AUTO-MCNC: NEGATIVE MG/DL
HBA1C MFR BLD: 5.7 % (ref 4–5.6)
HCT VFR BLD AUTO: 51.2 % (ref 36.6–50.3)
HDLC SERPL-MCNC: 77 MG/DL
HDLC SERPL: 2.8 {RATIO} (ref 0–5)
HGB BLD-MCNC: 16.8 G/DL (ref 12.1–17)
HGB UR QL STRIP: NEGATIVE
HYALINE CASTS URNS QL MICRO: ABNORMAL /LPF (ref 0–5)
KETONES UR QL STRIP.AUTO: NEGATIVE MG/DL
LDLC SERPL CALC-MCNC: 129.2 MG/DL (ref 0–100)
LEUKOCYTE ESTERASE UR QL STRIP.AUTO: NEGATIVE
MCH RBC QN AUTO: 32.1 PG (ref 26–34)
MCHC RBC AUTO-ENTMCNC: 32.8 G/DL (ref 30–36.5)
MCV RBC AUTO: 97.7 FL (ref 80–99)
NITRITE UR QL STRIP.AUTO: NEGATIVE
NRBC # BLD: 0 K/UL (ref 0–0.01)
NRBC BLD-RTO: 0 PER 100 WBC
PH UR STRIP: 5.5 [PH] (ref 5–8)
PLATELET # BLD AUTO: 292 K/UL (ref 150–400)
PMV BLD AUTO: 10.1 FL (ref 8.9–12.9)
POTASSIUM SERPL-SCNC: 5 MMOL/L (ref 3.5–5.1)
PROT SERPL-MCNC: 8 G/DL (ref 6.4–8.2)
PROT UR STRIP-MCNC: NEGATIVE MG/DL
RBC # BLD AUTO: 5.24 M/UL (ref 4.1–5.7)
RBC #/AREA URNS HPF: ABNORMAL /HPF (ref 0–5)
SODIUM SERPL-SCNC: 138 MMOL/L (ref 136–145)
SP GR UR REFRACTOMETRY: 1.02 (ref 1–1.03)
TRIGL SERPL-MCNC: 59 MG/DL (ref ?–150)
TSH SERPL DL<=0.05 MIU/L-ACNC: 0.68 UIU/ML (ref 0.36–3.74)
UA: UC IF INDICATED,UAUC: ABNORMAL
UROBILINOGEN UR QL STRIP.AUTO: 0.2 EU/DL (ref 0.2–1)
VLDLC SERPL CALC-MCNC: 11.8 MG/DL
WBC # BLD AUTO: 7.8 K/UL (ref 4.1–11.1)
WBC URNS QL MICRO: ABNORMAL /HPF (ref 0–4)

## 2023-07-27 ENCOUNTER — TELEPHONE (OUTPATIENT)
Age: 53
End: 2023-07-27

## 2023-10-24 ASSESSMENT — ENCOUNTER SYMPTOMS
ALLERGIC/IMMUNOLOGIC NEGATIVE: 1
EYES NEGATIVE: 1
RESPIRATORY NEGATIVE: 1
GASTROINTESTINAL NEGATIVE: 1

## 2023-10-25 ENCOUNTER — OFFICE VISIT (OUTPATIENT)
Age: 53
End: 2023-10-25
Payer: COMMERCIAL

## 2023-10-25 VITALS
HEART RATE: 74 BPM | OXYGEN SATURATION: 99 % | WEIGHT: 243.8 LBS | DIASTOLIC BLOOD PRESSURE: 82 MMHG | BODY MASS INDEX: 36.95 KG/M2 | RESPIRATION RATE: 16 BRPM | SYSTOLIC BLOOD PRESSURE: 114 MMHG | TEMPERATURE: 96.9 F | HEIGHT: 68 IN

## 2023-10-25 DIAGNOSIS — R73.03 PREDIABETES: ICD-10-CM

## 2023-10-25 DIAGNOSIS — G47.33 OSA (OBSTRUCTIVE SLEEP APNEA): ICD-10-CM

## 2023-10-25 DIAGNOSIS — L98.9 SCALP LESION: ICD-10-CM

## 2023-10-25 DIAGNOSIS — E55.9 VITAMIN D DEFICIENCY: ICD-10-CM

## 2023-10-25 DIAGNOSIS — Z00.00 ROUTINE PHYSICAL EXAMINATION: Primary | ICD-10-CM

## 2023-10-25 DIAGNOSIS — E78.5 HYPERLIPIDEMIA, UNSPECIFIED HYPERLIPIDEMIA TYPE: ICD-10-CM

## 2023-10-25 DIAGNOSIS — N28.9 ABNORMAL RENAL FUNCTION: ICD-10-CM

## 2023-10-25 LAB
ALBUMIN SERPL-MCNC: 4.1 G/DL (ref 3.5–5)
ALBUMIN/GLOB SERPL: 1.2 (ref 1.1–2.2)
ALP SERPL-CCNC: 82 U/L (ref 45–117)
ALT SERPL-CCNC: 28 U/L (ref 12–78)
ANION GAP SERPL CALC-SCNC: 2 MMOL/L (ref 5–15)
AST SERPL-CCNC: 28 U/L (ref 15–37)
BILIRUB SERPL-MCNC: 0.7 MG/DL (ref 0.2–1)
BUN SERPL-MCNC: 9 MG/DL (ref 6–20)
BUN/CREAT SERPL: 6 (ref 12–20)
CALCIUM SERPL-MCNC: 9.3 MG/DL (ref 8.5–10.1)
CHLORIDE SERPL-SCNC: 104 MMOL/L (ref 97–108)
CHOLEST SERPL-MCNC: 198 MG/DL
CO2 SERPL-SCNC: 30 MMOL/L (ref 21–32)
CREAT SERPL-MCNC: 1.45 MG/DL (ref 0.7–1.3)
ERYTHROCYTE [DISTWIDTH] IN BLOOD BY AUTOMATED COUNT: 13.1 % (ref 11.5–14.5)
GLOBULIN SER CALC-MCNC: 3.3 G/DL (ref 2–4)
GLUCOSE SERPL-MCNC: 97 MG/DL (ref 65–100)
HCT VFR BLD AUTO: 49.4 % (ref 36.6–50.3)
HDLC SERPL-MCNC: 64 MG/DL
HDLC SERPL: 3.1 (ref 0–5)
HGB BLD-MCNC: 16.4 G/DL (ref 12.1–17)
LDLC SERPL CALC-MCNC: 117.6 MG/DL (ref 0–100)
MCH RBC QN AUTO: 31.8 PG (ref 26–34)
MCHC RBC AUTO-ENTMCNC: 33.2 G/DL (ref 30–36.5)
MCV RBC AUTO: 95.7 FL (ref 80–99)
NRBC # BLD: 0 K/UL (ref 0–0.01)
NRBC BLD-RTO: 0 PER 100 WBC
PLATELET # BLD AUTO: 304 K/UL (ref 150–400)
PMV BLD AUTO: 10 FL (ref 8.9–12.9)
POTASSIUM SERPL-SCNC: 4.8 MMOL/L (ref 3.5–5.1)
PROT SERPL-MCNC: 7.4 G/DL (ref 6.4–8.2)
RBC # BLD AUTO: 5.16 M/UL (ref 4.1–5.7)
SODIUM SERPL-SCNC: 136 MMOL/L (ref 136–145)
TRIGL SERPL-MCNC: 82 MG/DL
TSH SERPL DL<=0.05 MIU/L-ACNC: 0.93 UIU/ML (ref 0.36–3.74)
VLDLC SERPL CALC-MCNC: 16.4 MG/DL
WBC # BLD AUTO: 8.4 K/UL (ref 4.1–11.1)

## 2023-10-25 PROCEDURE — 99396 PREV VISIT EST AGE 40-64: CPT | Performed by: INTERNAL MEDICINE

## 2023-10-25 SDOH — ECONOMIC STABILITY: FOOD INSECURITY: WITHIN THE PAST 12 MONTHS, YOU WORRIED THAT YOUR FOOD WOULD RUN OUT BEFORE YOU GOT MONEY TO BUY MORE.: NEVER TRUE

## 2023-10-25 SDOH — ECONOMIC STABILITY: FOOD INSECURITY: WITHIN THE PAST 12 MONTHS, THE FOOD YOU BOUGHT JUST DIDN'T LAST AND YOU DIDN'T HAVE MONEY TO GET MORE.: NEVER TRUE

## 2023-10-25 SDOH — ECONOMIC STABILITY: INCOME INSECURITY: HOW HARD IS IT FOR YOU TO PAY FOR THE VERY BASICS LIKE FOOD, HOUSING, MEDICAL CARE, AND HEATING?: NOT HARD AT ALL

## 2023-10-25 SDOH — ECONOMIC STABILITY: HOUSING INSECURITY
IN THE LAST 12 MONTHS, WAS THERE A TIME WHEN YOU DID NOT HAVE A STEADY PLACE TO SLEEP OR SLEPT IN A SHELTER (INCLUDING NOW)?: NO

## 2023-10-25 ASSESSMENT — PATIENT HEALTH QUESTIONNAIRE - PHQ9
SUM OF ALL RESPONSES TO PHQ QUESTIONS 1-9: 0
SUM OF ALL RESPONSES TO PHQ QUESTIONS 1-9: 0
1. LITTLE INTEREST OR PLEASURE IN DOING THINGS: 0
2. FEELING DOWN, DEPRESSED OR HOPELESS: 0
SUM OF ALL RESPONSES TO PHQ9 QUESTIONS 1 & 2: 0
SUM OF ALL RESPONSES TO PHQ QUESTIONS 1-9: 0
SUM OF ALL RESPONSES TO PHQ QUESTIONS 1-9: 0

## 2023-10-25 NOTE — PROGRESS NOTES
1. \"Have you been to the ER, urgent care clinic since your last visit? Hospitalized since your last visit? \" No    2. \"Have you seen or consulted any other health care providers outside of the [address] [place] [address] since your last visit? \" No     3. For patients aged 43-73: Has the patient had a colonoscopy / FIT/ Cologuard?  2021
Status: He is alert. Psychiatric:         Mood and Affect: Mood normal.         Thought Content: Thought content normal.         Judgment: Judgment normal.          ASSESSMENT and PLAN  Jordan Rayo was seen today for annual exam.    Diagnoses and all orders for this visit:    Routine physical examination  -     CBC; Future  -     Comprehensive Metabolic Panel; Future  -     Lipid Panel; Future  -     TSH; Future  -     Hemoglobin A1C; Future  -     Hemoglobin A1C  -     TSH  -     Lipid Panel  -     Comprehensive Metabolic Panel  -     CBC  Diet, exercise and weight reduction needed--d/w pt   Pt declines tdap shingrix flu shots   Recommended covid booster  Prediabetes  -     Hemoglobin A1C; Future  -     Hemoglobin A1C    RISHI (obstructive sleep apnea)   Not on cpap  Hyperlipidemia, unspecified hyperlipidemia type  -     Lipid Panel; Future  -     Lipid Panel    Vitamin D deficiency  -     Vitamin D 25 Hydroxy; Future  -     Vitamin D 25 Hydroxy    Abnormal renal function  -     UA W/Microscopic, Rfx to Culture ([facility] Default);  Future  -     US RETROPERITONEAL COMPLETE; Future  -     [facility]FL - Ishmael Price MD, Nephrology, [facility]  -     UA W/Microscopic, Rfx to Culture ([facility] Default)   No hx htn, dm-2 and rare nsaids  Scalp lesion  -     External Referral To Dermatology  BPH   Fu URO  Posterior neck pain   Strain or ddd   Tylenol prn   Xray and PT if flares occur  Rtc 1 year CPE  Nasima Arias MD

## 2023-10-26 LAB
25(OH)D3 SERPL-MCNC: 28 NG/ML (ref 30–100)
APPEARANCE UR: CLEAR
BACTERIA URNS QL MICRO: NEGATIVE /HPF
BILIRUB UR QL: NEGATIVE
COLOR UR: NORMAL
EPITH CASTS URNS QL MICRO: NORMAL /LPF
EST. AVERAGE GLUCOSE BLD GHB EST-MCNC: 120 MG/DL
GLUCOSE UR STRIP.AUTO-MCNC: NEGATIVE MG/DL
HBA1C MFR BLD: 5.8 % (ref 4–5.6)
HGB UR QL STRIP: NEGATIVE
HYALINE CASTS URNS QL MICRO: NORMAL /LPF (ref 0–5)
KETONES UR QL STRIP.AUTO: NEGATIVE MG/DL
LEUKOCYTE ESTERASE UR QL STRIP.AUTO: NEGATIVE
NITRITE UR QL STRIP.AUTO: NEGATIVE
PH UR STRIP: 7 (ref 5–8)
PROT UR STRIP-MCNC: NEGATIVE MG/DL
RBC #/AREA URNS HPF: NORMAL /HPF (ref 0–5)
SP GR UR REFRACTOMETRY: 1.02 (ref 1–1.03)
URINE CULTURE IF INDICATED: NORMAL
UROBILINOGEN UR QL STRIP.AUTO: 0.2 EU/DL (ref 0.2–1)
WBC URNS QL MICRO: NORMAL /HPF (ref 0–4)

## 2023-11-01 ENCOUNTER — HOSPITAL ENCOUNTER (OUTPATIENT)
Facility: HOSPITAL | Age: 53
Discharge: HOME | End: 2023-11-04
Attending: INTERNAL MEDICINE
Payer: COMMERCIAL

## 2023-11-01 DIAGNOSIS — N28.9 ABNORMAL RENAL FUNCTION: ICD-10-CM

## 2023-11-01 PROCEDURE — 76770 US EXAM ABDO BACK WALL COMP: CPT

## 2024-10-21 ENCOUNTER — TELEPHONE (OUTPATIENT)
Age: 54
End: 2024-10-21

## 2024-10-22 DIAGNOSIS — E55.9 VITAMIN D DEFICIENCY: ICD-10-CM

## 2024-10-22 DIAGNOSIS — R73.03 PREDIABETES: ICD-10-CM

## 2024-10-22 DIAGNOSIS — Z00.00 ROUTINE PHYSICAL EXAMINATION: Primary | ICD-10-CM

## 2024-10-22 DIAGNOSIS — N18.30 STAGE 3 CHRONIC KIDNEY DISEASE, UNSPECIFIED WHETHER STAGE 3A OR 3B CKD (HCC): ICD-10-CM

## 2024-10-25 ENCOUNTER — LAB (OUTPATIENT)
Age: 54
End: 2024-10-25

## 2024-10-25 DIAGNOSIS — E55.9 VITAMIN D DEFICIENCY: ICD-10-CM

## 2024-10-25 DIAGNOSIS — N18.30 STAGE 3 CHRONIC KIDNEY DISEASE, UNSPECIFIED WHETHER STAGE 3A OR 3B CKD (HCC): ICD-10-CM

## 2024-10-25 DIAGNOSIS — R73.03 PREDIABETES: ICD-10-CM

## 2024-10-25 DIAGNOSIS — Z00.00 ROUTINE PHYSICAL EXAMINATION: ICD-10-CM

## 2024-10-25 LAB
25(OH)D3 SERPL-MCNC: 24.6 NG/ML (ref 30–100)
ALBUMIN SERPL-MCNC: 4 G/DL (ref 3.5–5)
ALBUMIN/GLOB SERPL: 1.3 (ref 1.1–2.2)
ALP SERPL-CCNC: 90 U/L (ref 45–117)
ALT SERPL-CCNC: 24 U/L (ref 12–78)
ANION GAP SERPL CALC-SCNC: 6 MMOL/L (ref 2–12)
AST SERPL-CCNC: 17 U/L (ref 15–37)
BILIRUB SERPL-MCNC: 0.9 MG/DL (ref 0.2–1)
BUN SERPL-MCNC: 12 MG/DL (ref 6–20)
BUN/CREAT SERPL: 9 (ref 12–20)
CALCIUM SERPL-MCNC: 9.6 MG/DL (ref 8.5–10.1)
CHLORIDE SERPL-SCNC: 103 MMOL/L (ref 97–108)
CHOLEST SERPL-MCNC: 187 MG/DL
CO2 SERPL-SCNC: 29 MMOL/L (ref 21–32)
CREAT SERPL-MCNC: 1.4 MG/DL (ref 0.7–1.3)
ERYTHROCYTE [DISTWIDTH] IN BLOOD BY AUTOMATED COUNT: 13.4 % (ref 11.5–14.5)
EST. AVERAGE GLUCOSE BLD GHB EST-MCNC: 114 MG/DL
GLOBULIN SER CALC-MCNC: 3.1 G/DL (ref 2–4)
GLUCOSE SERPL-MCNC: 94 MG/DL (ref 65–100)
HBA1C MFR BLD: 5.6 % (ref 4–5.6)
HCT VFR BLD AUTO: 47.8 % (ref 36.6–50.3)
HDLC SERPL-MCNC: 67 MG/DL
HDLC SERPL: 2.8 (ref 0–5)
HGB BLD-MCNC: 15.9 G/DL (ref 12.1–17)
LDLC SERPL CALC-MCNC: 100.8 MG/DL (ref 0–100)
MCH RBC QN AUTO: 31.8 PG (ref 26–34)
MCHC RBC AUTO-ENTMCNC: 33.3 G/DL (ref 30–36.5)
MCV RBC AUTO: 95.6 FL (ref 80–99)
NRBC # BLD: 0 K/UL (ref 0–0.01)
NRBC BLD-RTO: 0 PER 100 WBC
PLATELET # BLD AUTO: 320 K/UL (ref 150–400)
PMV BLD AUTO: 10 FL (ref 8.9–12.9)
POTASSIUM SERPL-SCNC: 4 MMOL/L (ref 3.5–5.1)
PROT SERPL-MCNC: 7.1 G/DL (ref 6.4–8.2)
RBC # BLD AUTO: 5 M/UL (ref 4.1–5.7)
SODIUM SERPL-SCNC: 138 MMOL/L (ref 136–145)
TRIGL SERPL-MCNC: 96 MG/DL
TSH SERPL DL<=0.05 MIU/L-ACNC: 0.93 UIU/ML (ref 0.36–3.74)
VLDLC SERPL CALC-MCNC: 19.2 MG/DL
WBC # BLD AUTO: 10.2 K/UL (ref 4.1–11.1)

## 2024-10-26 NOTE — PROGRESS NOTES
HISTORY OF PRESENT ILLNESS   Nabil Goetz   is a 54 y.o.  male.  Fu prediabetes, mild HLD vi t d  Deficiency ,rishi not on cpap and obesity and CPE  URO-Dr Castaneda--elevated PSA--psa normal/stable  Nephro Dr Gao-assi CKD 2-will fu PRN  Saw Hem Dr Beal for MGUS-yearly fu    Otc vit d--takes only occassionaly    Weight down about 11 lbs -some diet changes and fasting  Exercise 5 d per week--weight lifting, kick bag    Had recnet labs wnl excpet low vit D and cr stable 1.40  Lasg OV  Last  cr 1.37  a1c 5.7  On viot d 5000 iu qd but not daily     Some intermittent pain in posterior neck that may radiate to right shoulder blade , usually occus when looking up or down x 1 year  Exercise 4-5 times  per week    C/o ED and decreased libido--request   Patient Active Problem List    Diagnosis Date Noted    Elevated PSA 10/23/2019    RISHI (obstructive sleep apnea) 10/23/2019    ED (erectile dysfunction) 10/23/2019    Skin lesion 03/19/2018    Hyperglycemia 06/04/2014    Obesity 09/24/2013     No current outpatient medications on file.     No current facility-administered medications for this visit.     No Known Allergies  Social History     Tobacco Use    Smoking status: Never    Smokeless tobacco: Never   Substance Use Topics    Alcohol use: No        BMP:   Lab Results   Component Value Date/Time     10/25/2024 11:53 AM    K 4.0 10/25/2024 11:53 AM     10/25/2024 11:53 AM    CO2 29 10/25/2024 11:53 AM    BUN 12 10/25/2024 11:53 AM    CREATININE 1.40 10/25/2024 11:53 AM    GLUCOSE 94 10/25/2024 11:53 AM    CALCIUM 9.6 10/25/2024 11:53 AM      CBC:   Lab Results   Component Value Date/Time    WBC 10.2 10/25/2024 11:53 AM    RBC 5.00 10/25/2024 11:53 AM    HGB 15.9 10/25/2024 11:53 AM    HCT 47.8 10/25/2024 11:53 AM    MCV 95.6 10/25/2024 11:53 AM    MCH 31.8 10/25/2024 11:53 AM    MCHC 33.3 10/25/2024 11:53 AM    RDW 13.4 10/25/2024 11:53 AM     10/25/2024 11:53 AM    MPV 10.0 10/25/2024 11:53

## 2024-10-28 ENCOUNTER — OFFICE VISIT (OUTPATIENT)
Age: 54
End: 2024-10-28

## 2024-10-28 VITALS
OXYGEN SATURATION: 99 % | SYSTOLIC BLOOD PRESSURE: 120 MMHG | RESPIRATION RATE: 18 BRPM | BODY MASS INDEX: 35.16 KG/M2 | TEMPERATURE: 97 F | WEIGHT: 232 LBS | DIASTOLIC BLOOD PRESSURE: 80 MMHG | HEART RATE: 76 BPM | HEIGHT: 68 IN

## 2024-10-28 DIAGNOSIS — D47.2 MGUS (MONOCLONAL GAMMOPATHY OF UNKNOWN SIGNIFICANCE): ICD-10-CM

## 2024-10-28 DIAGNOSIS — E66.9 OBESITY (BMI 30-39.9): ICD-10-CM

## 2024-10-28 DIAGNOSIS — Z00.00 ROUTINE PHYSICAL EXAMINATION: ICD-10-CM

## 2024-10-28 DIAGNOSIS — N52.9 ERECTILE DYSFUNCTION, UNSPECIFIED ERECTILE DYSFUNCTION TYPE: ICD-10-CM

## 2024-10-28 DIAGNOSIS — N18.2 CKD (CHRONIC KIDNEY DISEASE) STAGE 2, GFR 60-89 ML/MIN: ICD-10-CM

## 2024-10-28 DIAGNOSIS — N52.9 ERECTILE DYSFUNCTION, UNSPECIFIED ERECTILE DYSFUNCTION TYPE: Primary | ICD-10-CM

## 2024-10-28 RX ORDER — FLUTICASONE PROPIONATE 50 MCG
SPRAY, SUSPENSION (ML) NASAL
COMMUNITY
Start: 2024-10-16 | End: 2024-10-28 | Stop reason: ALTCHOICE

## 2024-10-28 SDOH — ECONOMIC STABILITY: FOOD INSECURITY: WITHIN THE PAST 12 MONTHS, YOU WORRIED THAT YOUR FOOD WOULD RUN OUT BEFORE YOU GOT MONEY TO BUY MORE.: NEVER TRUE

## 2024-10-28 SDOH — ECONOMIC STABILITY: INCOME INSECURITY: HOW HARD IS IT FOR YOU TO PAY FOR THE VERY BASICS LIKE FOOD, HOUSING, MEDICAL CARE, AND HEATING?: NOT HARD AT ALL

## 2024-10-28 SDOH — ECONOMIC STABILITY: FOOD INSECURITY: WITHIN THE PAST 12 MONTHS, THE FOOD YOU BOUGHT JUST DIDN'T LAST AND YOU DIDN'T HAVE MONEY TO GET MORE.: NEVER TRUE

## 2024-10-28 ASSESSMENT — PATIENT HEALTH QUESTIONNAIRE - PHQ9
SUM OF ALL RESPONSES TO PHQ QUESTIONS 1-9: 0
SUM OF ALL RESPONSES TO PHQ9 QUESTIONS 1 & 2: 0
1. LITTLE INTEREST OR PLEASURE IN DOING THINGS: NOT AT ALL
SUM OF ALL RESPONSES TO PHQ QUESTIONS 1-9: 0
SUM OF ALL RESPONSES TO PHQ QUESTIONS 1-9: 0
2. FEELING DOWN, DEPRESSED OR HOPELESS: NOT AT ALL
SUM OF ALL RESPONSES TO PHQ QUESTIONS 1-9: 0

## 2024-10-28 NOTE — PROGRESS NOTES
\"Have you been to the ER, urgent care clinic since your last visit?  Hospitalized since your last visit?\"    [facility] visit 2 weeks ago // sinus infection     “Have you seen or consulted any other health care providers outside our system since your last visit?”    NO

## 2024-10-30 LAB — TESTOST SERPL-MCNC: 652 NG/DL (ref 264–916)
